# Patient Record
Sex: FEMALE | Race: WHITE | Employment: OTHER | URBAN - NONMETROPOLITAN AREA
[De-identification: names, ages, dates, MRNs, and addresses within clinical notes are randomized per-mention and may not be internally consistent; named-entity substitution may affect disease eponyms.]

---

## 2017-01-05 ENCOUNTER — OFFICE VISIT (OUTPATIENT)
Dept: OTOLARYNGOLOGY | Facility: OTHER | Age: 67
End: 2017-01-05
Attending: PHYSICIAN ASSISTANT
Payer: MEDICARE

## 2017-01-05 VITALS
SYSTOLIC BLOOD PRESSURE: 116 MMHG | BODY MASS INDEX: 25.9 KG/M2 | OXYGEN SATURATION: 95 % | WEIGHT: 165 LBS | HEIGHT: 67 IN | TEMPERATURE: 99.3 F | HEART RATE: 82 BPM | DIASTOLIC BLOOD PRESSURE: 56 MMHG

## 2017-01-05 DIAGNOSIS — H90.5 SNHL (SENSORINEURAL HEARING LOSS): ICD-10-CM

## 2017-01-05 DIAGNOSIS — R22.0 SWELLING, MASS, OR LUMP ON FACE: ICD-10-CM

## 2017-01-05 DIAGNOSIS — R09.81 NASAL CONGESTION: Primary | ICD-10-CM

## 2017-01-05 DIAGNOSIS — J31.0 CHRONIC RHINITIS: ICD-10-CM

## 2017-01-05 DIAGNOSIS — J01.01 ACUTE RECURRENT MAXILLARY SINUSITIS: ICD-10-CM

## 2017-01-05 DIAGNOSIS — J30.2 SEASONAL ALLERGIC RHINITIS, UNSPECIFIED ALLERGIC RHINITIS TRIGGER: ICD-10-CM

## 2017-01-05 PROCEDURE — 86003 ALLG SPEC IGE CRUDE XTRC EA: CPT | Performed by: PHYSICIAN ASSISTANT

## 2017-01-05 PROCEDURE — 87102 FUNGUS ISOLATION CULTURE: CPT | Performed by: PHYSICIAN ASSISTANT

## 2017-01-05 PROCEDURE — 87077 CULTURE AEROBIC IDENTIFY: CPT | Performed by: PHYSICIAN ASSISTANT

## 2017-01-05 PROCEDURE — 99203 OFFICE O/P NEW LOW 30 MIN: CPT

## 2017-01-05 PROCEDURE — 99214 OFFICE O/P EST MOD 30 MIN: CPT | Performed by: PHYSICIAN ASSISTANT

## 2017-01-05 PROCEDURE — 87070 CULTURE OTHR SPECIMN AEROBIC: CPT | Performed by: PHYSICIAN ASSISTANT

## 2017-01-05 PROCEDURE — 87205 SMEAR GRAM STAIN: CPT | Performed by: PHYSICIAN ASSISTANT

## 2017-01-05 PROCEDURE — 99214 OFFICE O/P EST MOD 30 MIN: CPT

## 2017-01-05 PROCEDURE — 87186 SC STD MICRODIL/AGAR DIL: CPT | Mod: 91 | Performed by: PHYSICIAN ASSISTANT

## 2017-01-05 PROCEDURE — 36415 COLL VENOUS BLD VENIPUNCTURE: CPT | Performed by: PHYSICIAN ASSISTANT

## 2017-01-05 RX ORDER — MONTELUKAST SODIUM 10 MG/1
10 TABLET ORAL AT BEDTIME
Qty: 30 TABLET | Refills: 11 | Status: SHIPPED | OUTPATIENT
Start: 2017-01-05

## 2017-01-05 ASSESSMENT — PAIN SCALES - GENERAL: PAINLEVEL: NO PAIN (0)

## 2017-01-05 NOTE — PATIENT INSTRUCTIONS
Allergy blood panel to be completed. Results pending.   Consider skin testing.     Continue with Zyrtec  Continue with neti pot (sinus rinse)  Use rinse 1-2 times daily. Rinse, then wait 15 minutes and blow nose then use spray  Use Flonase 2 sprays to each nostril daily    Start singulair one tablet at night    Diagnostic imaging will contact you for test.     If there are concerns or questions, Call [phone] and ask for nurse

## 2017-01-05 NOTE — NURSING NOTE
"Chief Complaint   Patient presents with     Consult     allergies- referred by Rocky       Initial /56 mmHg  Pulse 82  Temp(Src) 99.3  F (37.4  C) (Tympanic)  Ht 5' 7\" (1.702 m)  Wt 165 lb (74.844 kg)  BMI 25.84 kg/m2  SpO2 95% Estimated body mass index is 25.84 kg/(m^2) as calculated from the following:    Height as of this encounter: 5' 7\" (1.702 m).    Weight as of this encounter: 165 lb (74.844 kg).  BP completed using cuff size: lauro Womack LPN      "

## 2017-01-05 NOTE — MR AVS SNAPSHOT
After Visit Summary   1/5/2017    Tamra Dc    MRN: [id number]           Patient Information     Date Of Birth          1950        Visit Information        Provider Department      1/5/2017 2:00 PM Silva Tinajero PA-C [facility]        Today's Diagnoses     Nasal congestion    -  1     Seasonal allergic rhinitis, unspecified allergic rhinitis trigger         Chronic rhinitis           Care Instructions    Allergy blood panel to be completed. Results pending.   Consider skin testing.     Continue with Zyrtec  Continue with neti pot (sinus rinse)  Use rinse 1-2 times daily. Rinse, then wait 15 minutes and blow nose then use spray  Use Flonase 2 sprays to each nostril daily    Start singulair one tablet at night    Diagnostic imaging will contact you for test.     If there are concerns or questions, Call [phone] and ask for nurse        Follow-ups after your visit        Follow-up notes from your care team     Return in about 4 weeks (around 2/2/2017).      Who to contact     If you have questions or need follow up information about today's clinic visit or your schedule please [facility] at [phone].  Normal or non-critical lab and imaging results will be communicated to you by MobileRQhart, letter or phone within 4 business days after the clinic has received the results. If you do not hear from us within 7 days, please contact the clinic through evlyt or phone. If you have a critical or abnormal lab result, we will notify you by phone as soon as possible.  Submit refill requests through Searchles or call your pharmacy and they will forward the refill request to us. Please allow 3 business days for your refill to be completed.          Additional Information About Your Visit        MobileRQharSimpliVity Information     Searchles lets you send messages to your doctor, view your test results, renew your prescriptions, schedule appointments and more. To sign up, go to  [email or web address] . Click on \"Log in\" on the left side of the screen, which will take you to the Welcome page. Then click on \"Sign up Now\" on the right side of the page.     You will be asked to enter the access code listed below, as well as some personal information. Please follow the directions to create your username and password.     Your access code is: [email or web address]  Expires: 2017  3:11 PM     Your access code will  in 90 days. If you need help or a new code, please call your [place] clinic or [phone].        Care EveryWhere ID     This is your Care EveryWhere ID. This could be used by other organizations to access your [place] medical records  [id number]        Your Vitals Were     Pulse Temperature Height BMI (Body Mass Index) Pulse Oximetry       82 99.3  F (37.4  C) (Tympanic) 5' 7\" (1.702 m) 25.84 kg/m2 95%        Blood Pressure from Last 3 Encounters:   17 116/56   16 129/54   05/09/15 149/72    Weight from Last 3 Encounters:   17 165 lb (74.844 kg)              We Performed the Following     Allergen alternaria alternata IgE     Allergen leroy white IgE     Allergen aspergillus fumigatus IgE     Allergen cat epithellium IgE     Allergen Cedar IgE     Allergen cladosporium herbarum IgE     Allergen Cockroach American     Allergen Cockroach Macedonian     Allergen cottonwood/aspen IgE     Allergen D farinae IgE     Allergen D pteronyssinus IgE     Allergen dog epithelium IgE     Allergen Epicoccum purpurascens IgE     Allergen giant ragweed IgE     Allergen Goose Feathers IgE     Allergen helminthosporium halodes IgE     Allergen horse dander IgE     Allergen maple box elder IgE     Allergen Mucor racemosus IgE     Allergen Mugwort IgE     Allergen oak white IgE     Allergen penicillium notatum IgE     Allergen ragweed short IgE     Allergen Red [place] IgE     Allergen Rhizopus nigricans IgE     Allergen Sagebrush Wormwood IgE     Allergen Sheep Scurry IgE     " Allergen silver  birch IgE     Allergen thistle Russian IgE     Allergen dhruv IgE     Allergen white pine IgE     Allergen Yellow Dock IgE     Allergen, Kochia/Firebush     Allergen, Pigweed          Today's Medication Changes          These changes are accurate as of: 1/5/17  3:11 PM.  If you have any questions, ask your nurse or doctor.               Start taking these medicines.        Dose/Directions    montelukast 10 MG tablet   Commonly known as:  SINGULAIR   Used for:  Seasonal allergic rhinitis, unspecified allergic rhinitis trigger, Chronic rhinitis, Nasal congestion   Started by:  Silva Tinajero PA-C        Dose:  10 mg   Take 1 tablet (10 mg) by mouth At Bedtime   Quantity:  30 tablet   Refills:  11            Where to get your medicines      These medications were sent to [facility] - [address] AT [address] [address]     Phone:  [phone]    - montelukast 10 MG tablet             Primary Care Provider Office Phone # Fax #    Fito Hidalgo [phone]       [address] [address]        Thank you!     Thank you for choosing [facility]  for your care. Our goal is always to provide you with excellent care. Hearing back from our patients is one way we can continue to improve our services. Please take a few minutes to complete the written survey that you may receive in the mail after your visit with us. Thank you!             Your Updated Medication List - Protect others around you: Learn how to safely use, store and throw away your medicines at www.disposemymeds.org.          This list is accurate as of: 1/5/17  3:11 PM.  Always use your most recent med list.                   Brand Name Dispense Instructions for use    ASPIRIN EC PO      Take 81 mg by mouth daily       calcium-vitamin D 600-400 MG-UNIT per tablet    CALTRATE     Take 1 tablet by  mouth 2 times daily       CARDIZEM  MG 24 hr capsule   Generic drug:  diltiazem      Take 240 mg by mouth daily       cetirizine 10 MG tablet    zyrTEC     Take 10 mg by mouth daily as needed for allergies       fluticasone 220 MCG/ACT Inhaler    FLOVENT HFA     Inhale 2 puffs into the lungs 2 times daily       fluticasone 50 MCG/ACT spray    FLONASE     Spray 1 spray into both nostrils daily       HYDROcodone-acetaminophen 5-325 MG per tablet    NORCO     Take 1 tablet by mouth every 4 hours as needed for moderate to severe pain       IBUPROFEN PO      Take 400 mg by mouth every 6 hours as needed for moderate pain       METOPROLOL SUCCINATE ER PO      Take 50 mg by mouth daily       montelukast 10 MG tablet    SINGULAIR    30 tablet    Take 1 tablet (10 mg) by mouth At Bedtime       MULTI VITAMIN DAILY PO      Take 1 tablet by mouth daily       SIMVASTATIN PO      Take 10 mg by mouth At Bedtime       SYNTHROID PO      Take 175 mcg by mouth daily       triamcinolone 0.1 % cream    KENALOG     Apply topically 2 times daily       VITAMIN D (CHOLECALCIFEROL) PO      Take 1,000 Units by mouth daily

## 2017-01-05 NOTE — PROGRESS NOTES
"  Chief Complaint   Patient presents with     Consult     allergies- referred by Rocky       Tamra presents with concerns regarding allergies. She had developed an URI following PND. She does have a hx of SCIT twice. She did have hx of SCIT while in high school and further had injections in her later years for 10 years.   She had positives ragweed, weeds, trees, cat, dog and dust.   She did do well following SCIT, but reports symptoms have been worsening. Tamra reports las testing was about 7-8 years ago.   Tamra has been using Flonase. She has tried Zyrtec, Allegra, Claritin, without relief. She has been using Flovent with good results. She has Neti pot and uses it at times.   She has continued post nasal drip and clearing her throat.     No recurrent sinusitis. No hx of sinus infection. No facial pain or pressure. She has no otalgia, otorrhea.     She has SNHL and [facility]   Denies vertigo.           No past medical history on file.   No Known Allergies  Current Outpatient Prescriptions   Medication     ASPIRIN EC PO     calcium-vitamin D (CALTRATE) 600-400 MG-UNIT per tablet     cetirizine (ZYRTEC) 10 MG tablet     VITAMIN D, CHOLECALCIFEROL, PO     diltiazem (CARDIZEM CD) 240 MG 24 hr capsule     fluticasone (FLONASE) 50 MCG/ACT spray     fluticasone (FLOVENT HFA) 220 MCG/ACT Inhaler     HYDROcodone-acetaminophen (NORCO) 5-325 MG per tablet     IBUPROFEN PO     METOPROLOL SUCCINATE ER PO     Multiple Vitamin (MULTI VITAMIN DAILY PO)     SIMVASTATIN PO     triamcinolone (KENALOG) 0.1 % cream     Levothyroxine Sodium (SYNTHROID PO)     No current facility-administered medications for this visit.      ROS: 10 point ROS neg other than the symptoms noted above in the HPI.    /56 mmHg  Pulse 82  Temp(Src) 99.3  F (37.4  C) (Tympanic)  Ht 5' 7\" (1.702 m)  Wt 165 lb (74.844 kg)  BMI 25.84 kg/m2  SpO2 95%      This is a 66 year old patient    General Appearance:  healthy, " alert, active, no distress and Lack of facial expression throughout exam.   Head: Normocephalic. No masses, lesions, tenderness or abnormalities  Eyes: conjuctiva clear, PERRL, EOM intact  Ears: External ears normal. Canals clear. TM's normal.  Nose: Nares normal  Mouth: normal  Neck: Supple, no cervical adenopathy, no thyromegaly, Full range of motion in all planes  Skin: Skin color, texture, turgor normal. No rashes or lesions.  Right mandibular angle palpable 1.5 cm lump.         The lips appear normal and without lesion.  The dentition is  in good condition  The patient has a normal appearing and functioning hard and soft palate without bifid uvula or submucosal cleft.  The tongue is normal in appearance without erosive lesion or fungating mass.  The oral mucosa is soft and normal in appearance.  The patient also has a soft floor of mouth and base of tongue.        ASSESSMENT:    ICD-10-CM    1. Nasal congestion R09.81 Allergen cottonwood/aspen IgE     Allergen dhruv IgE     Allergen ragweed short IgE     Allergen giant ragweed IgE     Allergen Mugwort IgE     Allergen thistle Russian IgE     Allergen, Pigweed     Allergen, Kochia/Firebush     Allergen Sheep Sorrel IgE     Allergen Yellow Dock IgE     Allergen Sagebrush Wormwood IgE     Allergen maple box elder IgE     Allergen silver  birch IgE     Allergen oak white IgE     Allergen leroy white IgE     Allergen white pine IgE     Allergen Red [place] IgE     Allergen Cedar IgE     Allergen dog epithelium IgE     Allergen cat epithellium IgE     Allergen horse dander IgE     Allergen Goose Feathers IgE     Allergen penicillium notatum IgE     Allergen cladosporium herbarum IgE     Allergen aspergillus fumigatus IgE     Allergen Mucor racemosus IgE     Allergen alternaria alternata IgE     Allergen helminthosporium halodes IgE     Allergen Rhizopus nigricans IgE     Allergen Epicoccum purpurascens IgE     Allergen D pteronyssinus IgE     Allergen D farinae IgE      Allergen Cockroach American     Allergen Cockroach North Korean     montelukast (SINGULAIR) 10 MG tablet     Sinus Culture Aerobic Bacterial     Fungus Culture, non-blood     Gram stain   2. Seasonal allergic rhinitis, unspecified allergic rhinitis trigger J30.2 Allergen cottonwood/aspen IgE     Allergen dhruv IgE     Allergen ragweed short IgE     Allergen giant ragweed IgE     Allergen Mugwort IgE     Allergen thistle Russian IgE     Allergen, Pigweed     Allergen, Kochia/Firebush     Allergen Sheep Sorrel IgE     Allergen Yellow Dock IgE     Allergen Sagebrush Wormwood IgE     Allergen maple box elder IgE     Allergen silver  birch IgE     Allergen oak white IgE     Allergen leroy white IgE     Allergen white pine IgE     Allergen Red [place] IgE     Allergen Cedar IgE     Allergen dog epithelium IgE     Allergen cat epithellium IgE     Allergen horse dander IgE     Allergen Goose Feathers IgE     Allergen penicillium notatum IgE     Allergen cladosporium herbarum IgE     Allergen aspergillus fumigatus IgE     Allergen Mucor racemosus IgE     Allergen alternaria alternata IgE     Allergen helminthosporium halodes IgE     Allergen Rhizopus nigricans IgE     Allergen Epicoccum purpurascens IgE     Allergen D pteronyssinus IgE     Allergen D farinae IgE     Allergen Cockroach American     Allergen Cockroach North Korean     montelukast (SINGULAIR) 10 MG tablet     Sinus Culture Aerobic Bacterial     Fungus Culture, non-blood     Gram stain   3. Chronic rhinitis J31.0 Allergen cottonwood/aspen IgE     Allergen dhruv IgE     Allergen ragweed short IgE     Allergen giant ragweed IgE     Allergen Mugwort IgE     Allergen thistle Russian IgE     Allergen, Pigweed     Allergen, Kochia/Firebush     Allergen Sheep Sorrel IgE     Allergen Yellow Dock IgE     Allergen Sagebrush Wormwood IgE     Allergen maple box elder IgE     Allergen silver  birch IgE     Allergen oak white IgE     Allergen leroy white IgE     Allergen white pine  IgE     Allergen Red Mabel IgE     Allergen Cedar IgE     Allergen dog epithelium IgE     Allergen cat epithellium IgE     Allergen horse dander IgE     Allergen Goose Feathers IgE     Allergen penicillium notatum IgE     Allergen cladosporium herbarum IgE     Allergen aspergillus fumigatus IgE     Allergen Mucor racemosus IgE     Allergen alternaria alternata IgE     Allergen helminthosporium halodes IgE     Allergen Rhizopus nigricans IgE     Allergen Epicoccum purpurascens IgE     Allergen D pteronyssinus IgE     Allergen D farinae IgE     Allergen Cockroach American     Allergen Cockroach French     montelukast (SINGULAIR) 10 MG tablet     Sinus Culture Aerobic Bacterial     Fungus Culture, non-blood     Gram stain   4. SNHL (sensorineural hearing loss) H90.5    5. Swelling, mass, or lump on face R22.0          Allergy blood panel to be completed. Results pending.   Consider skin testing pending the results. You will need to be off of your beta blocker prior to skin testing.     Continue with Zyrtec  Continue with neti pot (sinus rinse)  Use rinse 1-2 times daily. Rinse, then wait 15 minutes and blow nose then use spray  Use Flonase 2 sprays to each nostril daily    Start singulair one tablet at night    Diagnostic imaging will contact you for test. CT of soft tissue will be completed. Patient states possible cyst, however due to location I am going to rule out parotid involvement. She may require in office FNA     Continue with neurology appointment.     Indications for allergy testing include:    1) Confirm suspicion of allergic rhinitis due to inhalant allergies  2) Identify the offending allergen to determine specific mode of treatment  3) In the case of chronic rhinosinusitis: when symptoms are not controlled by avoidance and pharmacotherapy  4) In the Asthma patient when exacerbations may be due to perennial allergen exposure  5) Suspect food allergy  6) Otitis Media, chronic rhinitis, atopic  dermatitis, Meniere disease, headache, pharyngitis or eye symptoms      Modified quantitative testing (MQT) will be performed.  Signed consent was obtained, and the risks of immunotherapy were discussed, including the potential for anaphylaxis.     If immunotherapy (IT) is recommended, there is continued risk of anaphylaxis.   Anaphylaxis can cause death. The patient will need to be monitored for 30 minutes post injection.  They must present their epinephrine pen prior to injection.  Subcutaneous as well as sublingual immunotherapy (SLIT) were discussed as potential treatment options.  The patient was told SLIT is not approved by the FDA and is cash pay.  The general time frame of immunotherapy was discussed (generally 3-5 years, sometimes longer), and the basic immunology behind IT was discussed.    Silva Tinajero PA-C  [facility], [place]  [phone]

## 2017-01-06 LAB
GRAM STN SPEC: ABNORMAL
MICRO REPORT STATUS: ABNORMAL
SPECIMEN SOURCE: ABNORMAL

## 2017-01-10 LAB
A FUMIGATUS IGE QN: NORMAL KU(A)/L
AMER ROACH IGE QN: NORMAL KU(A)/L
BACTERIA SPEC CULT: ABNORMAL
CAT DANDER IGG QN: NORMAL KU(A)/L
COTTONWOOD IGE QN: NORMAL KU(A)/L
D FARINAE IGE QN: 0.55 KU(A)/L
D PTERONYSS IGE QN: 0.22 KU(A)/L
DEPRECATED IGE QN: NORMAL KU(A)/L
E PURPURASCENS IGE QN: NORMAL KU(A)/L
GIANT RAGWEED IGE QN: NORMAL KU(A)/L
GOOSE FEATHER IGE QN: NORMAL KU(A)/L
HORSE DANDER IGE QN: NORMAL KU(A)/L
M RACEMOSUS IGE QN: NORMAL KU(A)/L
MAPLE IGE QN: NORMAL KU(A)/L
MICRO REPORT STATUS: ABNORMAL
MICROORGANISM SPEC CULT: ABNORMAL
RED MULBERRY IGE QN: NORMAL KU(A)/L
SPECIMEN SOURCE: ABNORMAL
TIMOTHY IGE QN: NORMAL KU(A)/L
WHITE OAK IGE QN: NORMAL KU(A)/L
YELLOW DOCK IGE QN: NORMAL KU(A)/L

## 2017-01-11 LAB
A ALTERNATA IGE QN: NORMAL KU(A)/L
C HERBARUM IGE QN: NORMAL KU(A)/L
CEDAR IGE QN: NORMAL KU(A)/L
COMMON RAGWEED IGE QN: NORMAL KU(A)/L
DOG DANDER+EPITH IGE QN: NORMAL KU(A)/L
EAST WHITE PINE IGE QN: NORMAL KU(A)/L
FIREBUSH IGE QN: NORMAL KU(A)/L
MUGWORT IGE QN: NORMAL KU(A)/L
P NOTATUM IGE QN: NORMAL KU(A)/L
R NIGRICANS IGE QN: NORMAL KU(A)/L
ROACH IGE QN: NORMAL KU(A)/L
S ROSTRATA IGE QN: NORMAL KU(A)/L
SALTWORT IGE QN: NORMAL KU(A)/L
SHEEP SORREL IGE QN: NORMAL KU(A)/L
SILVER BIRCH IGE QN: NORMAL KU(A)/L
WHITE ASH IGE QN: NORMAL KU(A)/L
WORMWOOD IGE QN: NORMAL KU(A)/L

## 2017-01-12 RX ORDER — CIPROFLOXACIN 500 MG/1
500 TABLET, FILM COATED ORAL 2 TIMES DAILY
Qty: 20 TABLET | Refills: 0 | Status: SHIPPED | OUTPATIENT
Start: 2017-01-12 | End: 2017-01-13

## 2017-01-13 ENCOUNTER — TELEPHONE (OUTPATIENT)
Dept: ALLERGY | Facility: OTHER | Age: 67
End: 2017-01-13

## 2017-01-13 DIAGNOSIS — J01.01 ACUTE RECURRENT MAXILLARY SINUSITIS: Primary | ICD-10-CM

## 2017-01-13 RX ORDER — SULFAMETHOXAZOLE AND TRIMETHOPRIM 400; 80 MG/1; MG/1
1 TABLET ORAL 2 TIMES DAILY
Qty: 20 TABLET | Refills: 0 | Status: SHIPPED | OUTPATIENT
Start: 2017-01-13 | End: 2017-01-23

## 2017-01-13 NOTE — TELEPHONE ENCOUNTER
"This call was transferred to my .    Tamra called and LM stating her medication for \"bacteria\" was not helping.    I will transfer this call to JOANN Hopper, Silva Adan nurse.  Lesley Hoffmann    "

## 2017-01-13 NOTE — TELEPHONE ENCOUNTER
I spoke with pt and she is concerned about the possible side effects of the ciprofloxacin.  She said that it can tear your intestines and can cause death.  She is wondering if there is a different antibiotic that she can try.  I did let her know that she would have to take an antibiotic that is suscepitible to the bacteria that grew out in her culture.  She uses [facility].

## 2017-01-17 DIAGNOSIS — R22.1 MASS OF RIGHT SIDE OF NECK: Primary | ICD-10-CM

## 2017-01-18 ENCOUNTER — HOSPITAL ENCOUNTER (OUTPATIENT)
Dept: CT IMAGING | Facility: HOSPITAL | Age: 67
Discharge: HOME | End: 2017-01-18
Attending: PHYSICIAN ASSISTANT | Admitting: PHYSICIAN ASSISTANT
Payer: MEDICARE

## 2017-01-18 PROCEDURE — 70491 CT SOFT TISSUE NECK W/DYE: CPT | Mod: TC

## 2017-01-18 RX ORDER — IOPAMIDOL 612 MG/ML
100 INJECTION, SOLUTION INTRAVASCULAR ONCE
Status: COMPLETED | OUTPATIENT
Start: 2017-01-18 | End: 2017-01-18

## 2017-01-18 RX ADMIN — IOPAMIDOL 100 ML: 612 INJECTION, SOLUTION INTRAVASCULAR at 12:17

## 2017-02-03 ENCOUNTER — OFFICE VISIT (OUTPATIENT)
Dept: OTOLARYNGOLOGY | Facility: OTHER | Age: 67
End: 2017-02-03
Attending: OTOLARYNGOLOGY
Payer: MEDICARE

## 2017-02-03 VITALS
WEIGHT: 165 LBS | OXYGEN SATURATION: 95 % | SYSTOLIC BLOOD PRESSURE: 124 MMHG | DIASTOLIC BLOOD PRESSURE: 68 MMHG | RESPIRATION RATE: 15 BRPM | HEART RATE: 69 BPM | TEMPERATURE: 98.7 F | HEIGHT: 67 IN | BODY MASS INDEX: 25.9 KG/M2

## 2017-02-03 DIAGNOSIS — D23.30 CYST, DERMOID, FACE: Primary | ICD-10-CM

## 2017-02-03 DIAGNOSIS — R29.818 PARKINSONIAN FEATURES: ICD-10-CM

## 2017-02-03 DIAGNOSIS — R22.1 MASS OF NECK: ICD-10-CM

## 2017-02-03 LAB
FUNGUS SPEC CULT: NORMAL
MICRO REPORT STATUS: NORMAL
SPECIMEN SOURCE: NORMAL

## 2017-02-03 PROCEDURE — 00000155 ZZHCL STATISTIC H-CELL BLOCK W/STAIN: Performed by: OTOLARYNGOLOGY

## 2017-02-03 PROCEDURE — 99212 OFFICE O/P EST SF 10 MIN: CPT | Performed by: COUNSELOR

## 2017-02-03 PROCEDURE — 10021 FNA BX W/O IMG GDN 1ST LES: CPT | Performed by: OTOLARYNGOLOGY

## 2017-02-03 PROCEDURE — 99213 OFFICE O/P EST LOW 20 MIN: CPT | Mod: 25 | Performed by: OTOLARYNGOLOGY

## 2017-02-03 PROCEDURE — 88305 TISSUE EXAM BY PATHOLOGIST: CPT | Mod: TC | Performed by: OTOLARYNGOLOGY

## 2017-02-03 PROCEDURE — 88173 CYTOPATH EVAL FNA REPORT: CPT | Mod: TC | Performed by: OTOLARYNGOLOGY

## 2017-02-03 ASSESSMENT — PAIN SCALES - GENERAL: PAINLEVEL: NO PAIN (0)

## 2017-02-03 NOTE — PROGRESS NOTES
"Otolaryngology Progress Note      History of Present Illness   Patient presents with:  RECHECK: Neck Mass, CT done      Tamra Dc is a 66 year old female   with a neck mass here for eval, seen by Silva Tinajero ENT PA for rhinitis which is improving    10 year plus history of a right preauricualar mass  Denies drainage  No tobacco use  No otalgia or weight loss  Not biopsied  No history of parotiitis or distasteful oral drainge  Seems maybe to have enlarged slightly over past few months  No facial weaknes or paralysis    Physical Exam  /68 mmHg  Pulse 69  Temp(Src) 98.7  F (37.1  C) (Tympanic)  Resp 15  Ht 5' 7\" (1.702 m)  Wt 165 lb (74.844 kg)  BMI 25.84 kg/m2  SpO2 95%  General - The patient is well nourished and well developed, and appears to have good nutritional status.  Alert and oriented to person and place, interactive.  Mask facies, flat affect, parkinsonian  Head and Face - Normocephalic and atraumatic, with no gross asymmetry noted of the contour of the facial features.  The facial nerve is intact, with strong symmetric movements.  Neck-no palpable concerning fixed lymphadenopathy or thyroid mass.  Trachea is midline.  There is a right infra-auricular 2.0x1.2 cm cystic lesion, by palp SCT, facial nerve symmetric, no punctum or drainage, skin slightly bluish hue and lobular mass  Eyes - Extraocular movements intact.   Nose - Nasal mucosa is pink and moist with no abnormal mucus.  The septum was  non-obstructive, turbinates of normal size and position.  No polyps, masses, or purulence noted on examination.  Mouth - Examination of the oral cavity shows pink, healthy, moist mucosa.  No lesions or ulceration noted.  The dentition are in good repair.  The tongue is mobile and midline.  Throat - The walls of the oropharynx were smooth, pink, moist, symmetric, and had no lesions or ulcerations.  The tonsillar pillars and soft palate were symmetric.  The uvula was midline on elevation.        CT neck " reviewed with Tamra dated 1/18/17:    There is a 1.0 cm subcutaneous right preauricular cyst vs lymph node, underlying parotid normal and symmetric c/w left    After consent was obtained and risks discussed including temporary facial paralysis, need for further biopsy or surgery, time out was taken and the area prepped with alcohol, a skin marker was used to demarcate the mass in the right infraauricular skin.  1% lidocaine with 1:200,000 of epinephrine was used to anesthetize the are area.  The skin was prepped in the normal fashion and a time out was taken.  A 25 and 21 guage needle was used to obtain representative cellular material which was collected and prepared on slides and sent to pathology.    Impression/Plan  Tamra Dc is a 66 year old female    ICD-10-CM    1. Cyst, dermoid, face D23.30 Fine needle aspiration     FINE NEEDLE ASPIRATION; W/O IMAGING GUIDANCE   2. Mass of neck R22.1 Fine needle aspiration     FINE NEEDLE ASPIRATION; W/O IMAGING GUIDANCE   3. Parkinsonian features R25.9          The possibility of inadequate cellular material or non-diagnostic results, and the possibility of repeat FNA with image guidance,  was discussed with the patient    This is likely benign based on CT and history/exam but she was concerned enough to want further work up  We will contact her with path    Clean the area with half hydrogen peroxide, half tap water for 3 days  Follow up with ENT as needed if lesion enlarges significantly or drains      Viktoria Johnson D.O.  Otolaryngology/Head and Neck Surgery  Allergy

## 2017-02-03 NOTE — MR AVS SNAPSHOT
"              After Visit Summary   2/3/2017    Tamra Dc    MRN: [id number]           Patient Information     Date Of Birth          1950        Visit Information        Provider Department      2/3/2017 12:30 PM Viktoria Johnson MD [facility]        Care Instructions    Thank you for allowing Dr. Johnson and our ENT team to participate in your care.  If you have a scheduling or an appointment question please contact [facility] Unit Coordinator at their direct line [phone].   ALL nursing questions or concerns can be directed to your ENT nurse at: [phone] - Erin    We will call you with the results of the biopsy  Clean the area with half hydrogen peroxide, half tap water for 3 days  Follow up with ENT as needed        Follow-ups after your visit        Who to contact     If you have questions or need follow up information about today's clinic visit or your schedule please contact [facility] directly at [phone].  Normal or non-critical lab and imaging results will be communicated to you by MyChart, letter or phone within 4 business days after the clinic has received the results. If you do not hear from us within 7 days, please contact the clinic through [employer] or phone. If you have a critical or abnormal lab result, we will notify you by phone as soon as possible.  Submit refill requests through [employer] or call your pharmacy and they will forward the refill request to us. Please allow 3 business days for your refill to be completed.          Additional Information About Your Visit        [employer] Information     [employer] lets you send messages to your doctor, view your test results, renew your prescriptions, schedule appointments and more. To sign up, go to [email or web address] . Click on \"Log in\" on the left side of the screen, which will take you to the Welcome page. Then click on \"Sign up Now\" on the right side of the page.     You will be " "asked to enter the access code listed below, as well as some personal information. Please follow the directions to create your username and password.     Your access code is: [email or web address]  Expires: 2017  3:11 PM     Your access code will  in 90 days. If you need help or a new code, please call your [place] clinic or [phone].        Care EveryWhere ID     This is your Care EveryWhere ID. This could be used by other organizations to access your [place] medical records  [id number]        Your Vitals Were     Pulse Temperature Respirations Height BMI (Body Mass Index) Pulse Oximetry    69 98.7  F (37.1  C) (Tympanic) 15 5' 7\" (1.702 m) 25.84 kg/m2 95%       Blood Pressure from Last 3 Encounters:   17 124/68   17 116/56   16 129/54    Weight from Last 3 Encounters:   17 165 lb (74.844 kg)   17 165 lb (74.844 kg)              Today, you had the following     No orders found for display       Primary Care Provider Office Phone # Fax #    Fito Hidalgo [phone]       [place] [address] [address] [place], [place] [id number]        Thank you!     Thank you for choosing [facility]  for your care. Our goal is always to provide you with excellent care. Hearing back from our patients is one way we can continue to improve our services. Please take a few minutes to complete the written survey that you may receive in the mail after your visit with us. Thank you!             Your Updated Medication List - Protect others around you: Learn how to safely use, store and throw away your medicines at www.disposemymeds.org.          This list is accurate as of: 2/3/17 12:50 PM.  Always use your most recent med list.                   Brand Name Dispense Instructions for use    ASPIRIN EC PO      Take 81 mg by mouth daily       calcium-vitamin D 600-400 MG-UNIT per tablet    CALTRATE     Take 1 tablet by mouth 2 times daily       CARDIZEM  MG 24 hr " capsule   Generic drug:  diltiazem      Take 240 mg by mouth daily       cetirizine 10 MG tablet    zyrTEC     Take 10 mg by mouth daily as needed for allergies       fluticasone 220 MCG/ACT Inhaler    FLOVENT HFA     Inhale 2 puffs into the lungs 2 times daily       fluticasone 50 MCG/ACT spray    FLONASE     Spray 1 spray into both nostrils daily       IBUPROFEN PO      Take 400 mg by mouth every 6 hours as needed for moderate pain       METOPROLOL SUCCINATE ER PO      Take 50 mg by mouth daily       montelukast 10 MG tablet    SINGULAIR    30 tablet    Take 1 tablet (10 mg) by mouth At Bedtime       MULTI VITAMIN DAILY PO      Take 1 tablet by mouth daily       SIMVASTATIN PO      Take 10 mg by mouth At Bedtime       SYNTHROID PO      Take 175 mcg by mouth daily       triamcinolone 0.1 % cream    KENALOG     Apply topically 2 times daily

## 2017-02-03 NOTE — PATIENT INSTRUCTIONS
Thank you for allowing Dr. Johnson and our ENT team to participate in your care.  If you have a scheduling or an appointment question please contact [facility] Unit Coordinator at their direct line [phone].   ALL nursing questions or concerns can be directed to your ENT nurse at: [phone] - Erin    We will call you with the results of the biopsy  Clean the area with half hydrogen peroxide, half tap water for 3 days  Follow up with ENT as needed

## 2017-02-03 NOTE — NURSING NOTE
"Chief Complaint   Patient presents with     RECHECK     Neck Mass, CT done       Initial /68 mmHg  Pulse 69  Temp(Src) 98.7  F (37.1  C) (Tympanic)  Resp 15  Ht 5' 7\" (1.702 m)  Wt 165 lb (74.844 kg)  BMI 25.84 kg/m2  SpO2 95% Estimated body mass index is 25.84 kg/(m^2) as calculated from the following:    Height as of this encounter: 5' 7\" (1.702 m).    Weight as of this encounter: 165 lb (74.844 kg).  BP completed using cuff size: lauro Aguilera      "

## 2017-02-07 LAB — COPATH REPORT: NORMAL

## 2017-06-08 ENCOUNTER — TRANSFERRED RECORDS (OUTPATIENT)
Dept: HEALTH INFORMATION MANAGEMENT | Facility: CLINIC | Age: 67
End: 2017-06-08

## 2018-04-10 ENCOUNTER — OFFICE VISIT (OUTPATIENT)
Dept: SURGERY | Facility: OTHER | Age: 68
End: 2018-04-10
Attending: SURGERY
Payer: MEDICARE

## 2018-04-10 VITALS
BODY MASS INDEX: 27.47 KG/M2 | TEMPERATURE: 99.2 F | OXYGEN SATURATION: 97 % | SYSTOLIC BLOOD PRESSURE: 126 MMHG | DIASTOLIC BLOOD PRESSURE: 68 MMHG | WEIGHT: 175 LBS | HEIGHT: 67 IN | HEART RATE: 66 BPM

## 2018-04-10 DIAGNOSIS — I10 ESSENTIAL HYPERTENSION: ICD-10-CM

## 2018-04-10 DIAGNOSIS — Z12.11 SPECIAL SCREENING FOR MALIGNANT NEOPLASMS, COLON: Primary | ICD-10-CM

## 2018-04-10 DIAGNOSIS — Z01.818 ENCOUNTER FOR PREOPERATIVE EXAMINATION FOR GENERAL SURGICAL PROCEDURE: ICD-10-CM

## 2018-04-10 DIAGNOSIS — R19.5 OCCULT BLOOD POSITIVE STOOL: ICD-10-CM

## 2018-04-10 PROBLEM — L72.0 EPIDERMAL INCLUSION CYST: Status: ACTIVE | Noted: 2018-01-23

## 2018-04-10 PROBLEM — G20.A1 PARKINSON DISEASE (H): Status: ACTIVE | Noted: 2018-01-22

## 2018-04-10 PROCEDURE — G0463 HOSPITAL OUTPT CLINIC VISIT: HCPCS

## 2018-04-10 PROCEDURE — 99203 OFFICE O/P NEW LOW 30 MIN: CPT | Performed by: SURGERY

## 2018-04-10 PROCEDURE — 93005 ELECTROCARDIOGRAM TRACING: CPT | Performed by: INTERNAL MEDICINE

## 2018-04-10 PROCEDURE — 93010 ELECTROCARDIOGRAM REPORT: CPT | Performed by: INTERNAL MEDICINE

## 2018-04-10 RX ORDER — SODIUM, POTASSIUM,MAG SULFATES 17.5-3.13G
2 SOLUTION, RECONSTITUTED, ORAL ORAL SEE ADMIN INSTRUCTIONS
Qty: 2 BOTTLE | Refills: 0 | Status: ON HOLD | OUTPATIENT
Start: 2018-04-10 | End: 2018-04-18

## 2018-04-10 RX ORDER — HYDROCODONE BITARTRATE AND ACETAMINOPHEN 5; 325 MG/1; MG/1
TABLET ORAL
COMMUNITY
Start: 2015-10-15 | End: 2018-04-10

## 2018-04-10 RX ORDER — CARBIDOPA AND LEVODOPA 25; 100 MG/1; MG/1
1 TABLET ORAL 4 TIMES DAILY
COMMUNITY
Start: 2017-12-18

## 2018-04-10 ASSESSMENT — PAIN SCALES - GENERAL: PAINLEVEL: NO PAIN (0)

## 2018-04-10 NOTE — NURSING NOTE
"Chief Complaint   Patient presents with     Consult     colonoscopy consult.  Dr Hidalgo is referring.  Positive IFOB.  No family history history of colon cancer.  Last colonoscopy was 12 years.         Initial /68 (BP Location: Right arm, Patient Position: Chair, Cuff Size: Adult Regular)  Pulse 66  Temp 99.2  F (37.3  C) (Tympanic)  Ht 5' 7\" (1.702 m)  Wt 175 lb (79.4 kg)  SpO2 97%  BMI 27.41 kg/m2 Estimated body mass index is 27.41 kg/(m^2) as calculated from the following:    Height as of this encounter: 5' 7\" (1.702 m).    Weight as of this encounter: 175 lb (79.4 kg).  Medication Reconciliation: complete   ARIANA ALICEA      "

## 2018-04-10 NOTE — PATIENT INSTRUCTIONS
Thank you for allowing Dr. Nickerson and our surgical team to participate in your care.  If you have a scheduling or an appointment question please contact [facility] Unit Coordinator at her direct line [phone].   ALL nursing questions or concerns can be directed to Wendie at: [phone]     You are scheduled for a: colonoscopy  Your procedure date is: 4/18/18    You need a friend or family member available to drive you home AND stay with you for 24 hours after you leave the hospital. You will not be allowed to drive yourself. IF you need to take a taxi or the bus you MUST have a responsible person to ride with you. YOUR PROCEDURE WILL BE CANCELLED IF YOU DO NOT HAVE A RESPONSIBLE ADULT TO DRIVE YOU HOME.       You CANNOT have anything to eat or drink after midnight the night before your surgery, ncluding water and coffee. Your stomach needs to be completely empty. Do NOT chew gum, suck on hard candy, or smoke. You can brush your teeth the morning of surgery.       You need to call our Surgery Education Nurses 1-2 weeks prior to your surgery date at  [phone] or toll free [phone]. Please have you medication and allergy lists ready.      Stop your aspirin or other NSAIDs(Ibuprofen, Motrin, Aleve, Celebrex, Naproxen, etc...) 7 days before your surgery.      Hospital admitting will call you the day before your surgery with your arrival time. If you are scheduled on a Monday admitting will call you the Friday before.      Please call your primary care physician if you should become ill within 24 hours of scheduled surgery. (ex.vomiting, diarrhea, fever)  Surgery Education will contact you the day before your procedure between the hours of noon and 5 pm with the time you need to register in admitting at the hospital. Call Wendie with any questions [phone]  On day of surgery take your metoprolol with just a sip of water, hold all medications unless otherwise instructed by surgery  education.  Take tylenol only up until surgery for any minor aches and pains.

## 2018-04-10 NOTE — PROGRESS NOTES
Surgery Consult Clinic Note      RE: Tamra Dc  : 1950    Chief Complaint:  Positive colon screening test    History of Present Illness:  Mrs. Dc is a very pleasant 68 year old female who I am seeing at the request of Dr. Fito Hidalgo MD for evaluation of screening colon malignant neoplasm and consideration for colonoscopy.  She denies family history of colon or rectal cancer, blood in stool, changes in bowel habits, weight loss, abdominal pain.  She did have a positive iFOB.  Her last colonoscopy was in  and positive for diverticulosis.  Abdominal surgeries include a hyseterectomy.  She specifically denies fever, chills, nausea, vomiting, chest pain, shortness of breath or palpitations.      Medical history:  HTN  Parkinson    Surgical history:  Hysterectomy     Family history:  No family history on file.    Medications:  Prior to Admission medications    Medication Sig Start Date End Date Taking? Authorizing Provider   carbidopa-levodopa (SINEMET)  MG per tablet Take 1 tablet by mouth daily 17  Yes Reported, Patient   montelukast (SINGULAIR) 10 MG tablet Take 1 tablet (10 mg) by mouth At Bedtime 17   Silva Tinajero PA-C   ASPIRIN EC PO Take 81 mg by mouth daily    Reported, Patient   calcium-vitamin D (CALTRATE) 600-400 MG-UNIT per tablet Take 1 tablet by mouth 2 times daily    Reported, Patient   cetirizine (ZYRTEC) 10 MG tablet Take 10 mg by mouth daily as needed for allergies    Reported, Patient   diltiazem (CARDIZEM CD) 240 MG 24 hr capsule Take 240 mg by mouth daily    Reported, Patient   fluticasone (FLONASE) 50 MCG/ACT spray Spray 1 spray into both nostrils daily    Reported, Patient   fluticasone (FLOVENT HFA) 220 MCG/ACT Inhaler Inhale 2 puffs into the lungs 2 times daily    Reported, Patient   IBUPROFEN PO Take 400 mg by mouth every 6 hours as needed for moderate pain    Reported, Patient   METOPROLOL SUCCINATE ER PO Take 50 mg by mouth daily    Reported, Patient  "  Multiple Vitamin (MULTI VITAMIN DAILY PO) Take 1 tablet by mouth daily    Reported, Patient   SIMVASTATIN PO Take 10 mg by mouth At Bedtime    Reported, Patient   triamcinolone (KENALOG) 0.1 % cream Apply topically 2 times daily    Reported, Patient   Levothyroxine Sodium (SYNTHROID PO) Take 175 mcg by mouth daily     Reported, Patient       Allergies:  The patientis allergic to food and no clinical screening - see comments.  .  Social history:  Social History   Substance Use Topics     Smoking status: Never Smoker     Smokeless tobacco: Not on file     Alcohol use Not on file     Marital status: .      Review of Systems:    Constitutional: Negative for fever, chills and weight loss.   HENT: Negative for ear pain, nosebleeds, congestion, sore throat, tinnitus and ear discharge.    Eyes: Negative for blurred vision, double vision, photophobia and pain.   Respiratory: Negative for cough, hemoptysis, shortness of breath, wheezing and stridor.    Cardiovascular: Negative for chest pain, palpitations and orthopnea.   Gastrointestinal: Negative for heartburn, nausea, vomiting, abdominal pain and blood in stool.   Genitourinary: Negative for urgency, frequency and hematuria.   Musculoskeletal: Negative for myalgias, back pain and joint pain.   Neurological: Negative for tingling, speech change and headaches.   Endo/Heme/Allergies: Does not bruise/bleed easily.   Psychiatric/Behavioral: Negative for depression, suicidal ideas and hallucinations. The patient is not nervous/anxious.    Physical Examination:  /68 (BP Location: Right arm, Patient Position: Chair, Cuff Size: Adult Regular)  Pulse 66  Temp 99.2  F (37.3  C) (Tympanic)  Ht 1.702 m (5' 7\")  Wt 79.4 kg (175 lb)  SpO2 97%  BMI 27.41 kg/m2  General: AAOx4, NAD, WN/WD, ambulating without assistance  HEENT:NCAT, EOMI, PERRL Sclerae anicteric; Trachea mideline, no JVD  Chest:   Clear to auscultation bilaterally.  Cardiac: S1S2 , regular rate and " rhythm without additional sounds  Abdomen: Soft, ND/NT no rebound, no guarding  Extremities: Cursory exam unremarkable.  Skin: Warm, dry, < 2 sec cap refill  Neuro: CN 2-12 grossly intact, no focal deficit, GCS 15  Psych: happy, calm, asks appropriate questions    # Pain Assessment:  Current Pain Score 3/28/2016   Pain score (0-10) 1   Tamra aguilar pain level was assessed and she currently denies pain.        Assessment/Plan:  #1 + iFOB  #2 HTN    Thank you for the consult.  Mrs. Dc and CINDY had a long and sony discussion about colonoscopies.  The indications, risks, benefits, althernatives and technical aspects of whole colon colonoscopy were outlined with risks including, but not limited to, perforation, bleeding and inability to visualize entire colon.  Management of each was reviewed including the risk for life saving surgery and possible admittance to the ICU.  The need of mechanical preparation of the colon was reviewed along with the use of monitored anesthetic care which is needed to ensure proper visualization and safety concerns should biopsy be needed.  The patient's questions were asked and answered.  Scheduled first available date.      Dr Nickerson  [facility]  [address], [address]  [place]    [address]    Referring Provider:  Fito Hidalgo MD  [facility]  [address] [place] [id number]     Primary Care Provider:  Fito Hidalgo

## 2018-04-10 NOTE — MR AVS SNAPSHOT
After Visit Summary   4/10/2018    Tamra Dc    MRN: [id number]           Patient Information     Date Of Birth          1950        Visit Information        Provider Department      4/10/2018 2:00 PM Rashad Nickerson, DO [facility]        Today's Diagnoses     Special screening for malignant neoplasms, colon    -  1    Encounter for preoperative examination for general surgical procedure          Care Instructions          Thank you for allowing Dr. Nickerson and our surgical team to participate in your care.  If you have a scheduling or an appointment question please contact [facility] Unit Coordinator at her direct line [phone].   ALL nursing questions or concerns can be directed to Wendie at: [phone]     You are scheduled for a: colonoscopy  Your procedure date is: 4/18/18    You need a friend or family member available to drive you home AND stay with you for 24 hours after you leave the hospital. You will not be allowed to drive yourself. IF you need to take a taxi or the bus you MUST have a responsible person to ride with you. YOUR PROCEDURE WILL BE CANCELLED IF YOU DO NOT HAVE A RESPONSIBLE ADULT TO DRIVE YOU HOME.       You CANNOT have anything to eat or drink after midnight the night before your surgery, ncluding water and coffee. Your stomach needs to be completely empty. Do NOT chew gum, suck on hard candy, or smoke. You can brush your teeth the morning of surgery.       You need to call our Surgery Education Nurses 1-2 weeks prior to your surgery date at  [phone] or toll free [phone]. Please have you medication and allergy lists ready.      Stop your aspirin or other NSAIDs(Ibuprofen, Motrin, Aleve, Celebrex, Naproxen, etc...) 7 days before your surgery.      Hospital admitting will call you the day before your surgery with your arrival time. If you are scheduled on a Monday admitting will call you the Friday before.      Please call your  "primary care physician if you should become ill within 24 hours of scheduled surgery. (ex.vomiting, diarrhea, fever)  Surgery Education will contact you the day before your procedure between the hours of noon and 5 pm with the time you need to register in admitting at the hospital. Call Wendie with any questions [phone]  On day of surgery take your metoprolol with just a sip of water, hold all medications unless otherwise instructed by surgery education.  Take tylenol only up until surgery for any minor aches and pains.          Follow-ups after your visit        Who to contact     If you have questions or need follow up information about today's clinic visit or your schedule please contact [facility] directly at [phone].  Normal or non-critical lab and imaging results will be communicated to you by [email or web address], letter or phone within 4 business days after the clinic has received the results. If you do not hear from us within 7 days, please contact the clinic through [employer] or phone. If you have a critical or abnormal lab result, we will notify you by phone as soon as possible.  Submit refill requests through [employer] or call your pharmacy and they will forward the refill request to us. Please allow 3 business days for your refill to be completed.          Additional Information About Your Visit        [employer] Information     [employer] lets you send messages to your doctor, view your test results, renew your prescriptions, schedule appointments and more. To sign up, go to [email or web address] . Click on \"Log in\" on the left side of the screen, which will take you to the Welcome page. Then click on \"Sign up Now\" on the right side of the page.     You will be asked to enter the access code listed below, as well as some personal information. Please follow the directions to create your username and password.     Your access code is: [email or web address]  Expires: 7/9/2018  2:37 PM     Your access code will " " in 90 days. If you need help or a new code, please call your [place] clinic or [phone].        Care EveryWhere ID     This is your Care EveryWhere ID. This could be used by other organizations to access your [place] medical records  [id number]        Your Vitals Were     Pulse Temperature Height Pulse Oximetry BMI (Body Mass Index)       66 99.2  F (37.3  C) (Tympanic) 5' 7\" (1.702 m) 97% 27.41 kg/m2        Blood Pressure from Last 3 Encounters:   04/10/18 126/68   17 124/68   17 116/56    Weight from Last 3 Encounters:   04/10/18 175 lb (79.4 kg)   17 165 lb (74.8 kg)   17 165 lb (74.8 kg)              We Performed the Following     EKG 12-lead complete w/read - Clinics          Today's Medication Changes          These changes are accurate as of 4/10/18  2:37 PM.  If you have any questions, ask your nurse or doctor.               These medicines have changed or have updated prescriptions.        Dose/Directions    calcium-vitamin D 600-400 MG-UNIT per tablet   Commonly known as:  CALTRATE   This may have changed:  Another medication with the same name was removed. Continue taking this medication, and follow the directions you see here.        Dose:  1 tablet   Take 1 tablet by mouth 2 times daily   Refills:  0         Stop taking these medicines if you haven't already. Please contact your care team if you have questions.     HYDROcodone-acetaminophen 5-325 MG per tablet   Commonly known as:  NORCO           MULTIPLE VITAMIN/IRON OR                    Primary Care Provider Office Phone # Fax #    Fito Hidalgo [phone]       [facility] [place] [address]  [facility] [address]        Equal Access to Services     [facility]: Gayle Beckwith, abril bailey, domonique vegaaljuli calderón, juliet disla. So [facility] [phone].    ATENCIÓN: Si habla español, tiene a johns disposición servicios gratuitos de " asistencia lingüística. Marvin al [phone].    We comply with applicable federal civil rights laws and [place] laws. We do not discriminate on the basis of race, color, national origin, age, disability, sex, sexual orientation, or gender identity.            Thank you!     Thank you for choosing [facility]  for your care. Our goal is always to provide you with excellent care. Hearing back from our patients is one way we can continue to improve our services. Please take a few minutes to complete the written survey that you may receive in the mail after your visit with us. Thank you!             Your Updated Medication List - Protect others around you: Learn how to safely use, store and throw away your medicines at www.disposemymeds.org.          This list is accurate as of 4/10/18  2:37 PM.  Always use your most recent med list.                   Brand Name Dispense Instructions for use Diagnosis    ASPIRIN EC PO      Take 81 mg by mouth daily        calcium-vitamin D 600-400 MG-UNIT per tablet    CALTRATE     Take 1 tablet by mouth 2 times daily        carbidopa-levodopa  MG per tablet    SINEMET     Take 1 tablet by mouth daily        CARDIZEM  MG 24 hr capsule   Generic drug:  diltiazem      Take 240 mg by mouth daily        cetirizine 10 MG tablet    zyrTEC     Take 10 mg by mouth daily as needed for allergies        fluticasone 220 MCG/ACT Inhaler    FLOVENT HFA     Inhale 2 puffs into the lungs 2 times daily        fluticasone 50 MCG/ACT spray    FLONASE     Spray 1 spray into both nostrils daily        IBUPROFEN PO      Take 400 mg by mouth every 6 hours as needed for moderate pain        METOPROLOL SUCCINATE ER PO      Take 50 mg by mouth daily        montelukast 10 MG tablet    SINGULAIR    30 tablet    Take 1 tablet (10 mg) by mouth At Bedtime    Seasonal allergic rhinitis, unspecified allergic rhinitis trigger, Chronic rhinitis, Nasal congestion       MULTI VITAMIN DAILY PO       Take 1 tablet by mouth daily        SIMVASTATIN PO      Take 10 mg by mouth At Bedtime        SYNTHROID PO      Take 175 mcg by mouth daily        triamcinolone 0.1 % cream    KENALOG     Apply topically 2 times daily        TYLENOL PO      Take 325 mg by mouth every 8 hours as needed for mild pain or fever

## 2018-04-12 ENCOUNTER — TELEPHONE (OUTPATIENT)
Dept: SURGERY | Facility: OTHER | Age: 68
End: 2018-04-12

## 2018-04-12 NOTE — H&P (VIEW-ONLY)
Surgery Consult Clinic Note      RE: Tamra Dc  : 1950    Chief Complaint:  Positive colon screening test    History of Present Illness:  Mrs. Dc is a very pleasant 68 year old female who I am seeing at the request of Dr. Fito Hidalgo MD for evaluation of screening colon malignant neoplasm and consideration for colonoscopy.  She denies family history of colon or rectal cancer, blood in stool, changes in bowel habits, weight loss, abdominal pain.  She did have a positive iFOB.  Her last colonoscopy was in  and positive for diverticulosis.  Abdominal surgeries include a hyseterectomy.  She specifically denies fever, chills, nausea, vomiting, chest pain, shortness of breath or palpitations.      Medical history:  HTN  Parkinson    Surgical history:  Hysterectomy     Family history:  No family history on file.    Medications:  Prior to Admission medications    Medication Sig Start Date End Date Taking? Authorizing Provider   carbidopa-levodopa (SINEMET)  MG per tablet Take 1 tablet by mouth daily 17  Yes Reported, Patient   montelukast (SINGULAIR) 10 MG tablet Take 1 tablet (10 mg) by mouth At Bedtime 17   Silva Tinajero PA-C   ASPIRIN EC PO Take 81 mg by mouth daily    Reported, Patient   calcium-vitamin D (CALTRATE) 600-400 MG-UNIT per tablet Take 1 tablet by mouth 2 times daily    Reported, Patient   cetirizine (ZYRTEC) 10 MG tablet Take 10 mg by mouth daily as needed for allergies    Reported, Patient   diltiazem (CARDIZEM CD) 240 MG 24 hr capsule Take 240 mg by mouth daily    Reported, Patient   fluticasone (FLONASE) 50 MCG/ACT spray Spray 1 spray into both nostrils daily    Reported, Patient   fluticasone (FLOVENT HFA) 220 MCG/ACT Inhaler Inhale 2 puffs into the lungs 2 times daily    Reported, Patient   IBUPROFEN PO Take 400 mg by mouth every 6 hours as needed for moderate pain    Reported, Patient   METOPROLOL SUCCINATE ER PO Take 50 mg by mouth daily    Reported, Patient  "  Multiple Vitamin (MULTI VITAMIN DAILY PO) Take 1 tablet by mouth daily    Reported, Patient   SIMVASTATIN PO Take 10 mg by mouth At Bedtime    Reported, Patient   triamcinolone (KENALOG) 0.1 % cream Apply topically 2 times daily    Reported, Patient   Levothyroxine Sodium (SYNTHROID PO) Take 175 mcg by mouth daily     Reported, Patient       Allergies:  The patientis allergic to food and no clinical screening - see comments.  .  Social history:  Social History   Substance Use Topics     Smoking status: Never Smoker     Smokeless tobacco: Not on file     Alcohol use Not on file     Marital status: .      Review of Systems:    Constitutional: Negative for fever, chills and weight loss.   HENT: Negative for ear pain, nosebleeds, congestion, sore throat, tinnitus and ear discharge.    Eyes: Negative for blurred vision, double vision, photophobia and pain.   Respiratory: Negative for cough, hemoptysis, shortness of breath, wheezing and stridor.    Cardiovascular: Negative for chest pain, palpitations and orthopnea.   Gastrointestinal: Negative for heartburn, nausea, vomiting, abdominal pain and blood in stool.   Genitourinary: Negative for urgency, frequency and hematuria.   Musculoskeletal: Negative for myalgias, back pain and joint pain.   Neurological: Negative for tingling, speech change and headaches.   Endo/Heme/Allergies: Does not bruise/bleed easily.   Psychiatric/Behavioral: Negative for depression, suicidal ideas and hallucinations. The patient is not nervous/anxious.    Physical Examination:  /68 (BP Location: Right arm, Patient Position: Chair, Cuff Size: Adult Regular)  Pulse 66  Temp 99.2  F (37.3  C) (Tympanic)  Ht 1.702 m (5' 7\")  Wt 79.4 kg (175 lb)  SpO2 97%  BMI 27.41 kg/m2  General: AAOx4, NAD, WN/WD, ambulating without assistance  HEENT:NCAT, EOMI, PERRL Sclerae anicteric; Trachea mideline, no JVD  Chest:   Clear to auscultation bilaterally.  Cardiac: S1S2 , regular rate and " rhythm without additional sounds  Abdomen: Soft, ND/NT no rebound, no guarding  Extremities: Cursory exam unremarkable.  Skin: Warm, dry, < 2 sec cap refill  Neuro: CN 2-12 grossly intact, no focal deficit, GCS 15  Psych: happy, calm, asks appropriate questions    # Pain Assessment:  Current Pain Score 3/28/2016   Pain score (0-10) 1   Tamra aguilar pain level was assessed and she currently denies pain.        Assessment/Plan:  #1 + iFOB  #2 HTN    Thank you for the consult.  Mrs. Dc and CINDY had a long and sony discussion about colonoscopies.  The indications, risks, benefits, althernatives and technical aspects of whole colon colonoscopy were outlined with risks including, but not limited to, perforation, bleeding and inability to visualize entire colon.  Management of each was reviewed including the risk for life saving surgery and possible admittance to the ICU.  The need of mechanical preparation of the colon was reviewed along with the use of monitored anesthetic care which is needed to ensure proper visualization and safety concerns should biopsy be needed.  The patient's questions were asked and answered.  Scheduled first available date.      Dr Nickerson  [facility]  [address], [address]  [place]    [address]    Referring Provider:  Fito Hidalgo MD  [facility]  [address] [place] [address]     Primary Care Provider:  Fito Hidalgo

## 2018-04-12 NOTE — TELEPHONE ENCOUNTER
Writer called to inform patient of needing a pre op for procedure on 4/18/18 with Dr. Nickerson. No voicemail set up and mobile number is [facility] number. Writer unable to contact patient at this time. Writer will try again later.    Wendie Perry

## 2018-04-18 ENCOUNTER — ANESTHESIA (OUTPATIENT)
Dept: SURGERY | Facility: HOSPITAL | Age: 68
End: 2018-04-18
Payer: MEDICARE

## 2018-04-18 ENCOUNTER — ANESTHESIA EVENT (OUTPATIENT)
Dept: SURGERY | Facility: HOSPITAL | Age: 68
End: 2018-04-18
Payer: MEDICARE

## 2018-04-18 ENCOUNTER — APPOINTMENT (OUTPATIENT)
Dept: GENERAL RADIOLOGY | Facility: HOSPITAL | Age: 68
End: 2018-04-18
Attending: SURGERY
Payer: MEDICARE

## 2018-04-18 ENCOUNTER — HOSPITAL ENCOUNTER (OUTPATIENT)
Facility: HOSPITAL | Age: 68
Discharge: HOME | End: 2018-04-18
Attending: SURGERY | Admitting: SURGERY
Payer: MEDICARE

## 2018-04-18 VITALS
SYSTOLIC BLOOD PRESSURE: 129 MMHG | WEIGHT: 167.2 LBS | DIASTOLIC BLOOD PRESSURE: 54 MMHG | BODY MASS INDEX: 26.24 KG/M2 | RESPIRATION RATE: 18 BRPM | HEIGHT: 67 IN | HEART RATE: 62 BPM | TEMPERATURE: 97.7 F | OXYGEN SATURATION: 95 %

## 2018-04-18 DIAGNOSIS — K57.30 SIGMOID DIVERTICULOSIS: Primary | ICD-10-CM

## 2018-04-18 PROCEDURE — 27210794 ZZH OR GENERAL SUPPLY STERILE: Performed by: SURGERY

## 2018-04-18 PROCEDURE — 25000128 H RX IP 250 OP 636: Performed by: NURSE ANESTHETIST, CERTIFIED REGISTERED

## 2018-04-18 PROCEDURE — 45331 SIGMOIDOSCOPY AND BIOPSY: CPT | Performed by: SURGERY

## 2018-04-18 PROCEDURE — 37000008 ZZH ANESTHESIA TECHNICAL FEE, 1ST 30 MIN: Performed by: SURGERY

## 2018-04-18 PROCEDURE — 36000050 ZZH SURGERY LEVEL 2 1ST 30 MIN: Performed by: SURGERY

## 2018-04-18 PROCEDURE — 25000125 ZZHC RX 250: Performed by: NURSE ANESTHETIST, CERTIFIED REGISTERED

## 2018-04-18 PROCEDURE — 36000052 ZZH SURGERY LEVEL 2 EA 15 ADDTL MIN: Performed by: SURGERY

## 2018-04-18 PROCEDURE — 71000027 ZZH RECOVERY PHASE 2 EACH 15 MINS: Performed by: SURGERY

## 2018-04-18 PROCEDURE — 74280 X-RAY XM COLON 2CNTRST STD: CPT | Mod: TC

## 2018-04-18 PROCEDURE — 37000009 ZZH ANESTHESIA TECHNICAL FEE, EACH ADDTL 15 MIN: Performed by: SURGERY

## 2018-04-18 PROCEDURE — 45380 COLONOSCOPY AND BIOPSY: CPT | Performed by: ANESTHESIOLOGY

## 2018-04-18 PROCEDURE — 88305 TISSUE EXAM BY PATHOLOGIST: CPT | Mod: TC | Performed by: SURGERY

## 2018-04-18 PROCEDURE — 40000306 ZZH STATISTIC PRE PROC ASSESS II: Performed by: SURGERY

## 2018-04-18 PROCEDURE — 25000128 H RX IP 250 OP 636: Performed by: ANESTHESIOLOGY

## 2018-04-18 PROCEDURE — 45380 COLONOSCOPY AND BIOPSY: CPT | Performed by: NURSE ANESTHETIST, CERTIFIED REGISTERED

## 2018-04-18 RX ORDER — ONDANSETRON 2 MG/ML
4 INJECTION INTRAMUSCULAR; INTRAVENOUS EVERY 30 MIN PRN
Status: DISCONTINUED | OUTPATIENT
Start: 2018-04-18 | End: 2018-04-18 | Stop reason: HOSPADM

## 2018-04-18 RX ORDER — LIDOCAINE 40 MG/G
CREAM TOPICAL
Status: DISCONTINUED | OUTPATIENT
Start: 2018-04-18 | End: 2018-04-18 | Stop reason: HOSPADM

## 2018-04-18 RX ORDER — NALOXONE HYDROCHLORIDE 0.4 MG/ML
.1-.4 INJECTION, SOLUTION INTRAMUSCULAR; INTRAVENOUS; SUBCUTANEOUS
Status: DISCONTINUED | OUTPATIENT
Start: 2018-04-18 | End: 2018-04-18 | Stop reason: HOSPADM

## 2018-04-18 RX ORDER — DEXAMETHASONE SODIUM PHOSPHATE 4 MG/ML
4 INJECTION, SOLUTION INTRA-ARTICULAR; INTRALESIONAL; INTRAMUSCULAR; INTRAVENOUS; SOFT TISSUE EVERY 10 MIN PRN
Status: DISCONTINUED | OUTPATIENT
Start: 2018-04-18 | End: 2018-04-18 | Stop reason: HOSPADM

## 2018-04-18 RX ORDER — SODIUM CHLORIDE, SODIUM LACTATE, POTASSIUM CHLORIDE, CALCIUM CHLORIDE 600; 310; 30; 20 MG/100ML; MG/100ML; MG/100ML; MG/100ML
INJECTION, SOLUTION INTRAVENOUS CONTINUOUS
Status: DISCONTINUED | OUTPATIENT
Start: 2018-04-18 | End: 2018-04-18 | Stop reason: HOSPADM

## 2018-04-18 RX ORDER — FENTANYL CITRATE 50 UG/ML
25-50 INJECTION, SOLUTION INTRAMUSCULAR; INTRAVENOUS
Status: DISCONTINUED | OUTPATIENT
Start: 2018-04-18 | End: 2018-04-18 | Stop reason: HOSPADM

## 2018-04-18 RX ORDER — FENTANYL CITRATE 50 UG/ML
INJECTION, SOLUTION INTRAMUSCULAR; INTRAVENOUS PRN
Status: DISCONTINUED | OUTPATIENT
Start: 2018-04-18 | End: 2018-04-18

## 2018-04-18 RX ORDER — PROPOFOL 10 MG/ML
INJECTION, EMULSION INTRAVENOUS PRN
Status: DISCONTINUED | OUTPATIENT
Start: 2018-04-18 | End: 2018-04-18

## 2018-04-18 RX ORDER — ONDANSETRON 4 MG/1
4 TABLET, ORALLY DISINTEGRATING ORAL EVERY 30 MIN PRN
Status: DISCONTINUED | OUTPATIENT
Start: 2018-04-18 | End: 2018-04-18 | Stop reason: HOSPADM

## 2018-04-18 RX ORDER — FENTANYL CITRATE 50 UG/ML
25-50 INJECTION, SOLUTION INTRAMUSCULAR; INTRAVENOUS
Status: CANCELLED | OUTPATIENT
Start: 2018-04-18

## 2018-04-18 RX ORDER — LABETALOL HYDROCHLORIDE 5 MG/ML
10 INJECTION, SOLUTION INTRAVENOUS
Status: CANCELLED | OUTPATIENT
Start: 2018-04-18

## 2018-04-18 RX ORDER — HYDRALAZINE HYDROCHLORIDE 20 MG/ML
2.5-5 INJECTION INTRAMUSCULAR; INTRAVENOUS EVERY 10 MIN PRN
Status: CANCELLED | OUTPATIENT
Start: 2018-04-18

## 2018-04-18 RX ORDER — ALBUTEROL SULFATE 0.83 MG/ML
2.5 SOLUTION RESPIRATORY (INHALATION) EVERY 4 HOURS PRN
Status: CANCELLED | OUTPATIENT
Start: 2018-04-18

## 2018-04-18 RX ORDER — OXYCODONE HYDROCHLORIDE 5 MG/1
5 TABLET ORAL EVERY 4 HOURS PRN
Status: DISCONTINUED | OUTPATIENT
Start: 2018-04-18 | End: 2018-04-18 | Stop reason: HOSPADM

## 2018-04-18 RX ORDER — LIDOCAINE HYDROCHLORIDE 20 MG/ML
INJECTION, SOLUTION INFILTRATION; PERINEURAL PRN
Status: DISCONTINUED | OUTPATIENT
Start: 2018-04-18 | End: 2018-04-18

## 2018-04-18 RX ADMIN — PROPOFOL 10 MG: 10 INJECTION, EMULSION INTRAVENOUS at 08:29

## 2018-04-18 RX ADMIN — PROPOFOL 10 MG: 10 INJECTION, EMULSION INTRAVENOUS at 08:26

## 2018-04-18 RX ADMIN — PROPOFOL 10 MG: 10 INJECTION, EMULSION INTRAVENOUS at 08:33

## 2018-04-18 RX ADMIN — PROPOFOL 10 MG: 10 INJECTION, EMULSION INTRAVENOUS at 08:12

## 2018-04-18 RX ADMIN — PROPOFOL 10 MG: 10 INJECTION, EMULSION INTRAVENOUS at 08:14

## 2018-04-18 RX ADMIN — PROPOFOL 30 MG: 10 INJECTION, EMULSION INTRAVENOUS at 08:07

## 2018-04-18 RX ADMIN — PROPOFOL 30 MG: 10 INJECTION, EMULSION INTRAVENOUS at 08:08

## 2018-04-18 RX ADMIN — PROPOFOL 10 MG: 10 INJECTION, EMULSION INTRAVENOUS at 08:20

## 2018-04-18 RX ADMIN — MIDAZOLAM 1 MG: 1 INJECTION INTRAMUSCULAR; INTRAVENOUS at 08:16

## 2018-04-18 RX ADMIN — PROPOFOL 10 MG: 10 INJECTION, EMULSION INTRAVENOUS at 08:37

## 2018-04-18 RX ADMIN — FENTANYL CITRATE 50 MCG: 50 INJECTION, SOLUTION INTRAMUSCULAR; INTRAVENOUS at 08:16

## 2018-04-18 RX ADMIN — PROPOFOL 10 MG: 10 INJECTION, EMULSION INTRAVENOUS at 08:18

## 2018-04-18 RX ADMIN — PROPOFOL 10 MG: 10 INJECTION, EMULSION INTRAVENOUS at 08:22

## 2018-04-18 RX ADMIN — PROPOFOL 10 MG: 10 INJECTION, EMULSION INTRAVENOUS at 08:16

## 2018-04-18 RX ADMIN — PROPOFOL 10 MG: 10 INJECTION, EMULSION INTRAVENOUS at 08:39

## 2018-04-18 RX ADMIN — LIDOCAINE HYDROCHLORIDE 40 MG: 20 INJECTION, SOLUTION INFILTRATION; PERINEURAL at 08:07

## 2018-04-18 RX ADMIN — PROPOFOL 10 MG: 10 INJECTION, EMULSION INTRAVENOUS at 08:35

## 2018-04-18 RX ADMIN — PROPOFOL 10 MG: 10 INJECTION, EMULSION INTRAVENOUS at 08:24

## 2018-04-18 RX ADMIN — PROPOFOL 10 MG: 10 INJECTION, EMULSION INTRAVENOUS at 08:31

## 2018-04-18 RX ADMIN — MIDAZOLAM 1 MG: 1 INJECTION INTRAMUSCULAR; INTRAVENOUS at 08:04

## 2018-04-18 RX ADMIN — FENTANYL CITRATE 50 MCG: 50 INJECTION, SOLUTION INTRAMUSCULAR; INTRAVENOUS at 08:04

## 2018-04-18 RX ADMIN — SODIUM CHLORIDE, POTASSIUM CHLORIDE, SODIUM LACTATE AND CALCIUM CHLORIDE: 600; 310; 30; 20 INJECTION, SOLUTION INTRAVENOUS at 07:35

## 2018-04-18 RX ADMIN — GLUCAGON HYDROCHLORIDE 1 MG: KIT at 08:22

## 2018-04-18 ASSESSMENT — ENCOUNTER SYMPTOMS: DYSRHYTHMIAS: 1

## 2018-04-18 NOTE — OR NURSING
Patient and responsible adult given discharge instructions with no questions regarding instructions. Taj score 20. Pain level 0/10.  Discharged from unit via ambulatory. Patient discharged to xray.

## 2018-04-18 NOTE — IP AVS SNAPSHOT
MRN:[id number]                      After Visit Summary   4/18/2018    Tamra Dc    MRN: [id number]           Thank you!     Thank you for choosing [facility] for your care. Our goal is always to provide you with excellent care. Hearing back from our patients is one way we can continue to improve our services. Please take a few minutes to complete the written survey that you may receive in the mail after you visit with us. Thank you!        Patient Information     Date Of Birth          1950        About your hospital stay     You were admitted on:  April 18, 2018 You last received care in the:  [facility] Preop/Phase II    You were discharged on:  April 18, 2018       Who to Call     For medical emergencies, please call 911.  For non-urgent questions about your medical care, please call your primary care provider or clinic, [phone]  For questions related to your surgery, please call your surgery clinic        Attending Provider     Provider Specialty    Rashad Nickerson, DO Surgery       Primary Care Provider Office Phone # Fax #    Fito Hidalgo [phone]      Your next 10 appointments already scheduled     Apr 24, 2018  2:00 PM CDT   DX HIP/PELVIS/SPINE with HIDX1   HI DEXA ([facility] )    [address] [facility] [address] [phone]           Please do not take any of the following 24 hours prior to the day of your exam: vitamins, calcium tablets, antacids.  If possible, please wear clothes without metal (snaps, zippers). A sweatsuit works well.              Future tests that were ordered for you     XR Colon Air contrast                 Further instructions from your care team           INSTRUCTIONS AFTER COLONOSCOPY    WHEN YOU ARE BACK HOME:    Plan to rest for an hour or two after you get home.    You may have some cramping or pressure until you pass gas.    You may resume your regular medications.    Eat a small, light meal at first, and  then gradually return to normal meal sizes.  If you had a polyp removed:    Slight bleeding may occur.  You may have a slight blood stain on the toilet paper after a bowel movement.    To lessen the chance of bleeding, avoid heavy exercise for ONE WEEK.  This includes heavy lifting, vigorous sport activities, and heavy physical labor.  You may resume your normal sexual activity.      Avoid aspirin or aspirin products if instructed by your doctor.    WHAT TO WATCH FOR:  Problems rarely occur after the exam; however, it is important for you to watch for early signs of possible problems.  If you have     Unusual pain in your abdomen    Nausea and vomiting that persists    Excessive bleeding    Black or bloody bowel movements    Fever or temperature above 100.6 F  Please call your doctor ([facility] [phone]) or go to the nearest hospital emergency room.    Post-Anesthesia Patient Instructions    IMMEDIATELY FOLLOWING SURGERY:  Do not drive or operate machinery for the first twenty four hours after surgery.  Do not make any important decisions for twenty four hours after surgery or while taking narcotic pain medications or sedatives.  If you develop intractable nausea and vomiting or a severe headache please notify your doctor immediately.    FOLLOW-UP:  Please make an appointment with your surgeon as instructed. You do not need to follow up with anesthesia unless specifically instructed to do so.    WOUND CARE INSTRUCTIONS (if applicable):  Keep a dry clean dressing on the anesthesia/puncture wound site if there is drainage.  Once the wound has quit draining you may leave it open to air.  Generally you should leave the bandage intact for twenty four hours unless there is drainage.  If the epidural site drains for more than 36-48 hours please call the anesthesia department.    QUESTIONS?:  Please feel free to call your physician or the hospital  if you have any questions, and they will be happy to assist  "you.       Pending Results     No orders found from 2018 to 2018.            Admission Information     Date & Time Provider Department Dept. Phone    2018 Rashad Nickerson,  HI Preop/Phase -[phone]      Your Vitals Were     Blood Pressure Pulse Temperature Respirations Height Weight    148/61 62 97.7  F (36.5  C) (Oral) 16 1.702 m (5' 7\") 75.8 kg (167 lb 3.2 oz)    Pulse Oximetry BMI (Body Mass Index)                97% 26.19 kg/m2          [facility] Information     [employer] lets you send messages to your doctor, view your test results, renew your prescriptions, schedule appointments and more. To sign up, go to [email or web address] . Click on \"Log in\" on the left side of the screen, which will take you to the Welcome page. Then click on \"Sign up Now\" on the right side of the page.     You will be asked to enter the access code listed below, as well as some personal information. Please follow the directions to create your username and password.     Your access code is: [email or web address]  Expires: 2018  2:37 PM     Your access code will  in 90 days. If you need help or a new code, please call your [facility] clinic or [phone].        Care EveryWhere ID     This is your Care EveryWhere ID. This could be used by other organizations to access your [facility] medical records  [id number]        Equal Access to Services     LESTER JONES AH: Hadii lobito lora hadasho Sowilmarali, waaxda luqadaha, qaybta kaalmada stephane, juliet perez . [facility] [phone].    ATENCIÓN: Si habla español, tiene a johns disposición servicios gratuitos de asistencia lingüística. Marvin al [phone].    We comply with applicable federal civil rights laws and [place] laws. We do not discriminate on the basis of race, color, national origin, age, disability, sex, sexual orientation, or gender identity.               Review of your medicines      CONTINUE these medicines which have NOT CHANGED "        Dose / Directions    ASPIRIN EC PO        Dose:  81 mg   Take 81 mg by mouth daily   Refills:  0       calcium-vitamin D 600-400 MG-UNIT per tablet   Commonly known as:  CALTRATE        Dose:  1 tablet   Take 1 tablet by mouth 2 times daily   Refills:  0       carbidopa-levodopa  MG per tablet   Commonly known as:  SINEMET        Dose:  1 tablet   Take 1 tablet by mouth daily   Refills:  0       CARDIZEM  MG 24 hr capsule   Generic drug:  diltiazem        Dose:  240 mg   Take 240 mg by mouth daily   Refills:  0       cetirizine 10 MG tablet   Commonly known as:  zyrTEC        Dose:  10 mg   Take 10 mg by mouth daily as needed for allergies   Refills:  0       fluticasone 220 MCG/ACT Inhaler   Commonly known as:  FLOVENT HFA        Dose:  2 puff   Inhale 2 puffs into the lungs 2 times daily   Refills:  0       fluticasone 50 MCG/ACT spray   Commonly known as:  FLONASE        Dose:  1 spray   Spray 1 spray into both nostrils daily   Refills:  0       IBUPROFEN PO        Dose:  400 mg   Take 400 mg by mouth every 6 hours as needed for moderate pain   Refills:  0       METOPROLOL SUCCINATE ER PO        Dose:  100 mg   Take 100 mg by mouth daily   Refills:  0       montelukast 10 MG tablet   Commonly known as:  SINGULAIR   Used for:  Seasonal allergic rhinitis, unspecified allergic rhinitis trigger, Chronic rhinitis, Nasal congestion        Dose:  10 mg   Take 1 tablet (10 mg) by mouth At Bedtime   Quantity:  30 tablet   Refills:  11       MULTI VITAMIN DAILY PO        Dose:  1 tablet   Take 1 tablet by mouth daily   Refills:  0       SIMVASTATIN PO        Dose:  10 mg   Take 10 mg by mouth At Bedtime   Refills:  0       SYNTHROID PO        Dose:  175 mcg   Take 175 mcg by mouth daily   Refills:  0       triamcinolone 0.1 % cream   Commonly known as:  KENALOG        Apply topically 2 times daily   Refills:  0       TYLENOL PO        Dose:  325 mg   Take 325 mg by mouth every 8 hours as needed for mild  pain or fever   Refills:  0         STOP taking     Na Sulfate-K Sulfate-Mg Sulf solution   Commonly known as:  SUPREP BOWEL PREP                    Protect others around you: Learn how to safely use, store and throw away your medicines at www.disposemymeds.org.             Medication List: This is a list of all your medications and when to take them. Check marks below indicate your daily home schedule. Keep this list as a reference.      Medications           Morning Afternoon Evening Bedtime As Needed    ASPIRIN EC PO   Take 81 mg by mouth daily                                calcium-vitamin D 600-400 MG-UNIT per tablet   Commonly known as:  CALTRATE   Take 1 tablet by mouth 2 times daily                                carbidopa-levodopa  MG per tablet   Commonly known as:  SINEMET   Take 1 tablet by mouth daily                                CARDIZEM  MG 24 hr capsule   Take 240 mg by mouth daily   Generic drug:  diltiazem                                cetirizine 10 MG tablet   Commonly known as:  zyrTEC   Take 10 mg by mouth daily as needed for allergies                                fluticasone 220 MCG/ACT Inhaler   Commonly known as:  FLOVENT HFA   Inhale 2 puffs into the lungs 2 times daily                                fluticasone 50 MCG/ACT spray   Commonly known as:  FLONASE   Spray 1 spray into both nostrils daily                                IBUPROFEN PO   Take 400 mg by mouth every 6 hours as needed for moderate pain                                METOPROLOL SUCCINATE ER PO   Take 100 mg by mouth daily                                montelukast 10 MG tablet   Commonly known as:  SINGULAIR   Take 1 tablet (10 mg) by mouth At Bedtime                                MULTI VITAMIN DAILY PO   Take 1 tablet by mouth daily                                SIMVASTATIN PO   Take 10 mg by mouth At Bedtime                                SYNTHROID PO   Take 175 mcg by mouth daily                                 triamcinolone 0.1 % cream   Commonly known as:  KENALOG   Apply topically 2 times daily                                TYLENOL PO   Take 325 mg by mouth every 8 hours as needed for mild pain or fever

## 2018-04-18 NOTE — IP AVS SNAPSHOT
HI Preop/Phase II    [address] [address]    Phone:  [phone]                                       After Visit Summary   4/18/2018    Tamra Dc    MRN: [id number]           After Visit Summary Signature Page     I have received my discharge instructions, and my questions have been answered. I have discussed any challenges I see with this plan with the nurse or doctor.    ..........................................................................................................................................  Patient/Patient Representative Signature      ..........................................................................................................................................  Patient Representative Print Name and Relationship to Patient    ..................................................               ................................................  Date                                            Time    ..........................................................................................................................................  Reviewed by Signature/Title    ...................................................              ..............................................  Date                                                            Time

## 2018-04-18 NOTE — ANESTHESIA CARE TRANSFER NOTE
Patient: Tamra Dc    Procedure(s):  FLEXIBLE  SIGMOIDOSCOPY WITH POLYPECTOMY - Wound Class: II-Clean Contaminated    Diagnosis: OCCULT BLOOD POSITIVE STOOL  Diagnosis Additional Information: No value filed.    Anesthesia Type:   MAC     Note:  Airway :Nasal Cannula  Patient transferred to:Phase II  Handoff Report: Identifed the Patient, Identified the Reponsible Provider, Reviewed the pertinent medical history, Discussed the surgical course, Reviewed Intra-OP anesthesia mangement and issues during anesthesia, Set expectations for post-procedure period and Allowed opportunity for questions and acknowledgement of understanding      Vitals: (Last set prior to Anesthesia Care Transfer)    CRNA VITALS  4/18/2018 0813 - 4/18/2018 0846      4/18/2018             Pulse: 65    Ht Rate: 65    SpO2: 98 %    Resp Rate (set): 8                Electronically Signed By: LILIBETH Ross CRNA  April 18, 2018  8:46 AM

## 2018-04-18 NOTE — DISCHARGE INSTRUCTIONS
INSTRUCTIONS AFTER COLONOSCOPY    WHEN YOU ARE BACK HOME:    Plan to rest for an hour or two after you get home.    You may have some cramping or pressure until you pass gas.    You may resume your regular medications.    Eat a small, light meal at first, and then gradually return to normal meal sizes.  If you had a polyp removed:    Slight bleeding may occur.  You may have a slight blood stain on the toilet paper after a bowel movement.    To lessen the chance of bleeding, avoid heavy exercise for ONE WEEK.  This includes heavy lifting, vigorous sport activities, and heavy physical labor.  You may resume your normal sexual activity.      Avoid aspirin or aspirin products if instructed by your doctor.    WHAT TO WATCH FOR:  Problems rarely occur after the exam; however, it is important for you to watch for early signs of possible problems.  If you have     Unusual pain in your abdomen    Nausea and vomiting that persists    Excessive bleeding    Black or bloody bowel movements    Fever or temperature above 100.6 F  Please call your doctor ([facility] [phone]) or go to the nearest hospital emergency room.    Post-Anesthesia Patient Instructions    IMMEDIATELY FOLLOWING SURGERY:  Do not drive or operate machinery for the first twenty four hours after surgery.  Do not make any important decisions for twenty four hours after surgery or while taking narcotic pain medications or sedatives.  If you develop intractable nausea and vomiting or a severe headache please notify your doctor immediately.    FOLLOW-UP:  Please make an appointment with your surgeon as instructed. You do not need to follow up with anesthesia unless specifically instructed to do so.    WOUND CARE INSTRUCTIONS (if applicable):  Keep a dry clean dressing on the anesthesia/puncture wound site if there is drainage.  Once the wound has quit draining you may leave it open to air.  Generally you should leave the bandage intact for twenty four  hours unless there is drainage.  If the epidural site drains for more than 36-48 hours please call the anesthesia department.    QUESTIONS?:  Please feel free to call your physician or the hospital  if you have any questions, and they will be happy to assist you.

## 2018-04-18 NOTE — ANESTHESIA POSTPROCEDURE EVALUATION
Patient: Tamra Dc    Procedure(s):  FLEXIBLE  SIGMOIDOSCOPY WITH POLYPECTOMY - Wound Class: II-Clean Contaminated    Diagnosis:OCCULT BLOOD POSITIVE STOOL  Diagnosis Additional Information: No value filed.    Anesthesia Type:  MAC    Note:  Anesthesia Post Evaluation    Patient location during evaluation: Phase 2 and Bedside  Patient participation: Able to fully participate in evaluation  Level of consciousness: awake and alert  Pain management: adequate  Airway patency: patent  Cardiovascular status: acceptable  Respiratory status: acceptable  Hydration status: stable  PONV: none     Anesthetic complications: None          Last vitals:  Vitals:    04/18/18 1030 04/18/18 1035 04/18/18 1100   BP: 111/51 129/54    Pulse:      Resp: 18 18    Temp:      SpO2: 97% 97% 95%         Electronically Signed By: Bruce Cade MD  April 18, 2018  11:07 AM

## 2018-04-18 NOTE — BRIEF OP NOTE
[facility] - Brief Operative Note    Pre-operative diagnosis: Positive colon screening test   Post-operative diagnosis Diverticulosis, mid rectal polyp, internal hemorrhoids   Procedure: Flexible sigmoidoscopy    Surgeon: Rashad Nickerson DO   Anesthesia: Monitor Anesthesia Care    Estimated blood loss: 0   Blood transfusion: No transfusion was given during surgery   Drains: 0   Specimens: Rectal polyp   Findings: Severe sigmoid diverticulosis, unable to advance colonoscope past 50cm, small sessile polyp mid rectum, hemorrhoids   Complications: None   Condition: Stable   Comments: Details included in dictated operative note.

## 2018-04-18 NOTE — ANESTHESIA PREPROCEDURE EVALUATION
Anesthesia Evaluation     . Pt has had prior anesthetic.     No history of anesthetic complications          ROS/MED HX    ENT/Pulmonary:     (+)allergic rhinitis, , . .    Neurologic:     (+)Parkinson's disease     Cardiovascular:     (+) Dyslipidemia, hypertension-range: on beta blocker (metoprolol) , ---. : . . . :. dysrhythmias (supraventricular tachycardia ) Other, . Previous cardiac testing date:results:date: results:ECG reviewed date:4/10/2018 results:NSR@61, ?inferior infarct age undetermined date: results:          METS/Exercise Tolerance:     Hematologic:     (+) Anemia, -      Musculoskeletal:   (+) arthritis, , , other musculoskeletal- DJD      GI/Hepatic:     (+) bowel prep, Other GI/Hepatic OCCULT BLOOD POSITIVE STOOL, diverticulosis      Renal/Genitourinary:  - ROS Renal section negative       Endo:     (+) thyroid problem hypothyroidism, .      Psychiatric:  - neg psychiatric ROS       Infectious Disease:  - neg infectious disease ROS       Malignancy:   (+) Malignancy History of Breast          Other:    - neg other ROS                 Physical Exam  Normal systems: dental    Airway   Mallampati: IV  TM distance: >3 FB  Neck ROM: full    Dental     Cardiovascular   Rhythm and rate: regular and normal      Pulmonary    breath sounds clear to auscultation                    Anesthesia Plan      History & Physical Review  History and physical reviewed and following examination; no interval change.    ASA Status:  3 .    NPO Status:  > 8 hours    Plan for MAC with Intravenous and Propofol induction. Maintenance will be TIVA.  Reason for MAC:  Chronic cardiopulmonary disease (G9) and Other - see comments  PONV prophylaxis:  Ondansetron (or other 5HT-3)  Surgeon requests deep sedation. Patient is an ASA 3. Will provide MAC.      Postoperative Care  Postoperative pain management:  IV analgesics.      Consents  Anesthetic plan, risks, benefits and alternatives discussed with:  Patient..                           .

## 2018-04-18 NOTE — PROCEDURES
Procedure Date: 04/18/2018      DATE OF SERVICE:  04/18/2018      PREOPERATIVE DIAGNOSIS:  Positive colon malignant screening test.      POSTOPERATIVE DIAGNOSIS:  Diverticulosis, mid rectal polyp, internal hemorrhoids.        PROCEDURE:  Flexible sigmoidoscopy.      INDICATION:  Positive colon screening test.      SURGEON:  Maia Nickerson DO      DESCRIPTION OF PROCEDURE:  The patient was brought into the endoscopy suite and placed in the left lateral decubitus position.  After preprocedural pause and attended monitored anesthesia was administered, the external anus was inspected and was normal.  Digital rectal exam was positive for hemorrhoids.  The colonoscope was inserted and advanced with a great deal of difficulty through the sigmoid colon.  There were multiple large-mouth diverticula in the sigmoid colon.  At 50 cm from the anal verge there was an acute angulation of the colon that I could not safely advance the colonoscope past.  Multiple attempts were tried with abdominal pressure, patient on her back, patient on her right side.  I then removed the scope and attempted with the pediatric scope.  Again at 50 cm I encountered that acute angulation that again in multiple patient positions and multiple person abdominal pressure I just could not advance past this corner.  At this point I felt the risk of perforation was too high to proceed any further.  The colonoscope then was slowly withdrawn evaluating the length of the sigmoid colon that I was able to get into as well as the rectum.  At the mid rectum a small sessile polyp was removed using cold biting forceps.  Retroflexion in the rectum was positive for grade 1 internal hemorrhoids.  Extra air was removed from the colon and the colonoscope withdrawn.  The patient tolerated the procedure well and then was referred for barium enema.  Timing for interval colonoscopy will depend upon the histologic evaluation of the polyp.         MAIA NICKERSON DO              D: 2018   T: 2018   MT: APRIL      Name:     LISA ERICKSON   MRN:      [id number]        Account:        [id number]   :      1950           Procedure Date: 2018      Document: [id number]

## 2018-04-19 LAB — COPATH REPORT: NORMAL

## 2018-04-26 ENCOUNTER — APPOINTMENT (OUTPATIENT)
Dept: GENERAL RADIOLOGY | Facility: HOSPITAL | Age: 68
End: 2018-04-26
Attending: PHYSICIAN ASSISTANT
Payer: MEDICARE

## 2018-04-26 ENCOUNTER — HOSPITAL ENCOUNTER (EMERGENCY)
Facility: HOSPITAL | Age: 68
Discharge: HOME | End: 2018-04-26
Attending: PHYSICIAN ASSISTANT | Admitting: PHYSICIAN ASSISTANT
Payer: MEDICARE

## 2018-04-26 ENCOUNTER — HOSPITAL ENCOUNTER (OUTPATIENT)
Dept: BONE DENSITY | Facility: HOSPITAL | Age: 68
Discharge: HOME | End: 2018-04-26
Attending: FAMILY MEDICINE | Admitting: FAMILY MEDICINE
Payer: MEDICARE

## 2018-04-26 VITALS
WEIGHT: 170 LBS | OXYGEN SATURATION: 96 % | RESPIRATION RATE: 16 BRPM | DIASTOLIC BLOOD PRESSURE: 61 MMHG | HEART RATE: 72 BPM | TEMPERATURE: 98.2 F | BODY MASS INDEX: 26.63 KG/M2 | SYSTOLIC BLOOD PRESSURE: 176 MMHG

## 2018-04-26 DIAGNOSIS — S80.211A KNEE ABRASION, RIGHT, INITIAL ENCOUNTER: ICD-10-CM

## 2018-04-26 DIAGNOSIS — E28.39 ESTROGEN DEFICIENCY: ICD-10-CM

## 2018-04-26 DIAGNOSIS — S63.256A DISLOCATION OF RIGHT LITTLE FINGER, INITIAL ENCOUNTER: ICD-10-CM

## 2018-04-26 PROCEDURE — G0463 HOSPITAL OUTPT CLINIC VISIT: HCPCS

## 2018-04-26 PROCEDURE — 40000268 ZZH STATISTIC NO CHARGES

## 2018-04-26 PROCEDURE — 73140 X-RAY EXAM OF FINGER(S): CPT | Mod: TC,RT

## 2018-04-26 PROCEDURE — 99212 OFFICE O/P EST SF 10 MIN: CPT | Mod: 25 | Performed by: PHYSICIAN ASSISTANT

## 2018-04-26 PROCEDURE — 26770 TREAT FINGER DISLOCATION: CPT | Performed by: PHYSICIAN ASSISTANT

## 2018-04-26 PROCEDURE — G0463 HOSPITAL OUTPT CLINIC VISIT: HCPCS | Mod: 25

## 2018-04-26 PROCEDURE — 77080 DXA BONE DENSITY AXIAL: CPT | Mod: TC

## 2018-04-26 PROCEDURE — 26770 TREAT FINGER DISLOCATION: CPT

## 2018-04-26 ASSESSMENT — ENCOUNTER SYMPTOMS
WOUND: 1
CONSTITUTIONAL NEGATIVE: 1
ARTHRALGIAS: 1
NECK PAIN: 0
PSYCHIATRIC NEGATIVE: 1
NECK STIFFNESS: 0
NEUROLOGICAL NEGATIVE: 1
CARDIOVASCULAR NEGATIVE: 1

## 2018-04-26 NOTE — ED PROVIDER NOTES
"  History     Chief Complaint   Patient presents with     Hand Pain     \"little finger right hand bent from a fall on the cement, landing on right knee and hand today\"     The history is provided by the patient. No  was used.     Tamra Dc is a 68 year old female who has right 5th finger pain and obvious deformity. Has abrasion to right knee. Pt just fell. Did not hit her head. No neck pain. Tripped on uneven pavement    Problem List:    Patient Active Problem List    Diagnosis Date Noted     Epidermal inclusion cyst 01/23/2018     Priority: Medium     Parkinson disease (H) 01/22/2018     Priority: Medium     Primary osteoarthritis of both knees 09/13/2016     Priority: Medium     Seborrheic keratosis 03/04/2013     Priority: Medium     Osteopenia 06/08/2009     Priority: Medium     Sustained supraventricular tachycardia (H) 02/18/2009     Priority: Medium     Essential hypertension 01/28/2009     Priority: Medium     Overview:   IMO Update       Blood glucose abnormal 06/18/2007     Priority: Medium     Overview:   IMO Update 10/11       Allergic state 01/10/2007     Priority: Medium     Overview:   IMO Update 10/11       Malignant neoplasm of upper-outer quadrant of female breast (H) 06/08/2006     Priority: Medium     Hyperlipidemia 06/05/2006     Priority: Medium     Overview:   IMO Update 10/11       Hypothyroidism 06/05/2006     Priority: Medium     Overview:   IMO Update 10/11          Past Medical History:    No past medical history on file.    Past Surgical History:    Past Surgical History:   Procedure Laterality Date     COLONOSCOPY N/A 4/18/2018    Procedure: COLONOSCOPY;  FLEXIBLE  SIGMOIDOSCOPY WITH POLYPECTOMY;  Surgeon: Rashad Nickerson DO;  Location: [facility]       Family History:    No family history on file.    Social History:  Marital Status:   [2]  Social History   Substance Use Topics     Smoking status: Never Smoker     Smokeless tobacco: Never Used     Alcohol " use Not on file        Medications:      Acetaminophen (TYLENOL PO)   ASPIRIN EC PO   calcium-vitamin D (CALTRATE) 600-400 MG-UNIT per tablet   carbidopa-levodopa (SINEMET)  MG per tablet   cetirizine (ZYRTEC) 10 MG tablet   diltiazem (CARDIZEM CD) 240 MG 24 hr capsule   fluticasone (FLONASE) 50 MCG/ACT spray   fluticasone (FLOVENT HFA) 220 MCG/ACT Inhaler   IBUPROFEN PO   Levothyroxine Sodium (SYNTHROID PO)   METOPROLOL SUCCINATE ER PO   montelukast (SINGULAIR) 10 MG tablet   Multiple Vitamin (MULTI VITAMIN DAILY PO)   SIMVASTATIN PO   triamcinolone (KENALOG) 0.1 % cream         Review of Systems   Constitutional: Negative.    HENT: Negative.    Cardiovascular: Negative.    Musculoskeletal: Positive for arthralgias. Negative for neck pain and neck stiffness.   Skin: Positive for wound.   Neurological: Negative.    Psychiatric/Behavioral: Negative.        Physical Exam   BP: 176/61  Pulse: 72  Temp: 98.2  F (36.8  C)  Resp: 16  Weight: 77.1 kg (170 lb)  SpO2: 96 %      Physical Exam   Constitutional: She is oriented to person, place, and time. She appears well-developed and well-nourished. No distress.   Cardiovascular: Normal rate.    Pulmonary/Chest: Effort normal.   Musculoskeletal:   Right 5th finger: at PIP joint finger turns laterally approx 80 degrees. M/n/v intact. I offered the pt to have a digital block before having me reduce the dislocation. Pt chose not to have a block. I was able to easily reduce the dislocation. M/n/v intact. +AFROM, 5/5 strength. Mild TTP to the PIP joint. Finger splint applied. Pt tolerated well.    Right knee: minor abrasion. No TTP. Applied antibiotic ointment, 4x4 gauze and loose coban   Neurological: She is alert and oriented to person, place, and time.   Skin: She is not diaphoretic.   Psychiatric: She has a normal mood and affect.   Nursing note and vitals reviewed.      ED Course     ED Course     Procedures            Results for orders placed or performed during the  hospital encounter of 04/26/18 (from the past 24 hour(s))   XR Finger Right G/E 2 Views    Narrative    PROCEDURE:  XR FINGER RT G/E 2 VW    HISTORY: fell;     COMPARISON:  None.    TECHNIQUE:  2 views of the right fifth finger were obtained.    FINDINGS:  No fracture or dislocation is identified. The joint spaces  are preserved.        Impression    IMPRESSION: No acute fracture.      PACO CALLAHAN MD       Assessments & Plan (with Medical Decision Making)     I have reviewed the nursing notes.    I have reviewed the findings, diagnosis, plan and need for follow up with the patient.      Final diagnoses:   Dislocation of right little finger, initial encounter - Reduced.  Xrays negative   Knee abrasion, right, initial encounter       Wear splint for comfort and support  Patient verbally educated and given appropriate education sheets for the diagnoses and has no questions.  Follow up with your Primary Care provider if symptoms increase or if concerns develop, return to the ER  Dasha Macdonald Certified  Physician Assistant  4/26/2018  8:16 PM  URGENT CARE CLINIC      4/26/2018   [facility]     Dasha Macdonald PA  04/26/18 2016       Dasha Macdonald PA  04/26/18 9027

## 2018-04-26 NOTE — ED NOTES
Patient presents with a little finger that is sticking out sideways from hand.  Patient fell today on the side walk.

## 2018-04-26 NOTE — ED AVS SNAPSHOT
[facility]    [address] [place] [address]    [place] [address]    Phone:  [phone]                                       Tamra Dc   MRN: [id number]    Department:  [facility]   Date of Visit:  4/26/2018           After Visit Summary Signature Page     I have received my discharge instructions, and my questions have been answered. I have discussed any challenges I see with this plan with the nurse or doctor.    ..........................................................................................................................................  Patient/Patient Representative Signature      ..........................................................................................................................................  Patient Representative Print Name and Relationship to Patient    ..................................................               ................................................  Date                                            Time    ..........................................................................................................................................  Reviewed by Signature/Title    ...................................................              ..............................................  Date                                                            Time

## 2018-04-26 NOTE — ED AVS SNAPSHOT
[facility]    [address] [address]    Phone:  [phone]                                       Tamra Dc   MRN: [id number]    Department:  [facility]   Date of Visit:  4/26/2018           Patient Information     Date Of Birth          1950        Your diagnoses for this visit were:     Dislocation of right little finger, initial encounter Reduced.  Xrays negative    Knee abrasion, right, initial encounter        You were seen by Dasha Macdonald PA.      Follow-up Information     Follow up with Fito Hidalgo MD.    Specialty:  Family Practice    Why:  If symptoms worsen    Contact information:    [facility]  [address] [address] [phone]          Follow up with [facility].    Specialty:  EMERGENCY MEDICINE    Why:  If further concerns develop    Contact information:    [address] [address] [phone]    Additional information:    From [facility]: Take [address]. Turn left at [address]. Turn left at the first stoplight on [address]. At the first stop sign, take a right onto [address]. Take a left into the parking lot and continue through until you reach the North enterance of the building.       From [place]: Take [address]. Take the [address] ramp towards [place]. Turn left onto [address]. Take a slight right onto [address]. Turn left at the first stoplight on [address]. At the first stop sign, take a right onto [address]. Take a left into the parking lot and continue through until you reach the North enterance of the building.       From [place]: Take [address]. Take a right at [address]. At the first stop sign, take a right onto [address]. Take a left into the parking lot and continue through until you reach the North enterance of the building.       Discharge  References/Attachments     ABRASIONS (ENGLISH)    DISLOCATION, FINGER (ENGLISH)      Your next 10 appointments already scheduled     Apr 26, 2018  1:30 PM CDT   DX HIP/PELVIS/SPINE with HIDX1   [facility] ([facility] )    [address] [facility] [id number] [phone]           Please do not take any of the following 24 hours prior to the day of your exam: vitamins, calcium tablets, antacids.  If possible, please wear clothes without metal (snaps, zippers). A sweatsuit works well.                 Review of your medicines      Our records show that you are taking the medicines listed below. If these are incorrect, please call your family doctor or clinic.        Dose / Directions Last dose taken    ASPIRIN EC PO   Dose:  81 mg        Take 81 mg by mouth daily   Refills:  0        calcium-vitamin D 600-400 MG-UNIT per tablet   Commonly known as:  CALTRATE   Dose:  1 tablet        Take 1 tablet by mouth 2 times daily   Refills:  0        carbidopa-levodopa  MG per tablet   Commonly known as:  SINEMET   Dose:  1 tablet        Take 1 tablet by mouth daily   Refills:  0        CARDIZEM  MG 24 hr capsule   Dose:  240 mg   Generic drug:  diltiazem        Take 240 mg by mouth daily   Refills:  0        cetirizine 10 MG tablet   Commonly known as:  zyrTEC   Dose:  10 mg        Take 10 mg by mouth daily as needed for allergies   Refills:  0        fluticasone 220 MCG/ACT Inhaler   Commonly known as:  FLOVENT HFA   Dose:  2 puff        Inhale 2 puffs into the lungs 2 times daily   Refills:  0        fluticasone 50 MCG/ACT spray   Commonly known as:  FLONASE   Dose:  1 spray        Spray 1 spray into both nostrils daily   Refills:  0        IBUPROFEN PO   Dose:  400 mg        Take 400 mg by mouth every 6 hours as needed for moderate pain   Refills:  0        METOPROLOL SUCCINATE ER PO   Dose:  100 mg        Take 100 mg by mouth daily   Refills:  0        montelukast 10 MG tablet   Commonly known as:   "SINGULAIR   Dose:  10 mg   Quantity:  30 tablet        Take 1 tablet (10 mg) by mouth At Bedtime   Refills:  11        MULTI VITAMIN DAILY PO   Dose:  1 tablet        Take 1 tablet by mouth daily   Refills:  0        SIMVASTATIN PO   Dose:  10 mg        Take 10 mg by mouth At Bedtime   Refills:  0        SYNTHROID PO   Dose:  175 mcg        Take 175 mcg by mouth daily   Refills:  0        triamcinolone 0.1 % cream   Commonly known as:  KENALOG        Apply topically 2 times daily as needed   Refills:  0        TYLENOL PO   Dose:  325 mg        Take 325 mg by mouth every 8 hours as needed for mild pain or fever   Refills:  0                Procedures and tests performed during your visit     XR Finger Right G/E 2 Views      Orders Needing Specimen Collection     None      Pending Results     No orders found from 2018 to 2018.            Pending Culture Results     No orders found from 2018 to 2018.            Thank you for choosing [facility]       Thank you for choosing [facility] for your care. Our goal is always to provide you with excellent care. Hearing back from our patients is one way we can continue to improve our services. Please take a few minutes to complete the written survey that you may receive in the mail after you visit with us. Thank you!        [employer] Information     [facility] lets you send messages to your doctor, view your test results, renew your prescriptions, schedule appointments and more. To sign up, go to [email or web address] . Click on \"Log in\" on the left side of the screen, which will take you to the Welcome page. Then click on \"Sign up Now\" on the right side of the page.     You will be asked to enter the access code listed below, as well as some personal information. Please follow the directions to create your username and password.     Your access code is: [email or web address]  Expires: 2018  2:37 PM     Your access code will  in 90 days. If you need help or a new " code, please call your [facility] clinic or [phone].        Care EveryWhere ID     This is your Care EveryWhere ID. This could be used by other organizations to access your [facility] medical records  [id number]        Equal Access to Services     LESTER JONES : Gayle Beckwith, wahinada luqadaha, qaybta kaalmada stephane, juliet disla. So [facility] [phone].    ATENCIÓN: Si habla español, tiene a johns disposición servicios gratuitos de asistencia lingüística. Llame al [phone].    We comply with applicable federal civil rights laws and [place] laws. We do not discriminate on the basis of race, color, national origin, age, disability, sex, sexual orientation, or gender identity.            After Visit Summary       This is your record. Keep this with you and show to your community pharmacist(s) and doctor(s) at your next visit.

## 2019-09-19 ENCOUNTER — TRANSFERRED RECORDS (OUTPATIENT)
Dept: HEALTH INFORMATION MANAGEMENT | Facility: CLINIC | Age: 69
End: 2019-09-19

## 2019-10-10 ENCOUNTER — TRANSFERRED RECORDS (OUTPATIENT)
Dept: HEALTH INFORMATION MANAGEMENT | Facility: CLINIC | Age: 69
End: 2019-10-10

## 2019-10-31 NOTE — PROGRESS NOTES
Otolaryngology Consultation    Patient: Tamra Dc  : 1950    Patient presents with:  RECHECK: Follow Up Preauricular Cyst  referral:  Dr. Felix, BILL    HPI:  Tamra Dc is a 69 year old female seen today for right facial cyst  She was last seen on 2/3/2017 for removal of 1 cm right preauricular cyst    She was referred back to us by Dr. Felix who noted a 2 cm mobile subcutaneous mass of the right ankle of the mandible      CAT scan in 2017 revealed this was a 1 cm in a subcutaneous lesion along the superficial margin of the right parotid felt to be either a sebaceous cyst or a lymph node but also could be a small nodule within the right parotid    FNA of the right facial cyst was obtained at her 2017 visit which was negative for malignancy    History of Parkinsons     There has been no facial weakness    Current Outpatient Rx   Medication Sig Dispense Refill     Acetaminophen (TYLENOL PO) Take 325 mg by mouth every 8 hours as needed for mild pain or fever       ASPIRIN EC PO Take 81 mg by mouth daily       calcium-vitamin D (CALTRATE) 600-400 MG-UNIT per tablet Take 1 tablet by mouth 2 times daily       carbidopa-levodopa (SINEMET)  MG per tablet Take 1 tablet by mouth daily       cetirizine (ZYRTEC) 10 MG tablet Take 10 mg by mouth daily as needed for allergies       diltiazem (CARDIZEM CD) 240 MG 24 hr capsule Take 240 mg by mouth daily       fluticasone (FLONASE) 50 MCG/ACT spray Spray 1 spray into both nostrils daily       fluticasone (FLOVENT HFA) 220 MCG/ACT Inhaler Inhale 2 puffs into the lungs 2 times daily       IBUPROFEN PO Take 400 mg by mouth every 6 hours as needed for moderate pain       Levothyroxine Sodium (SYNTHROID PO) Take 175 mcg by mouth daily        METOPROLOL SUCCINATE ER PO Take 100 mg by mouth daily        montelukast (SINGULAIR) 10 MG tablet Take 1 tablet (10 mg) by mouth At Bedtime 30 tablet 11     Multiple Vitamin (MULTI VITAMIN DAILY PO) Take 1 tablet by mouth  "daily       SIMVASTATIN PO Take 10 mg by mouth At Bedtime       triamcinolone (KENALOG) 0.1 % cream Apply topically 2 times daily as needed          Allergies: Food and No clinical screening - see comments     History reviewed. No pertinent past medical history.    Past Surgical History:   Procedure Laterality Date     COLONOSCOPY N/A 4/18/2018    Procedure: COLONOSCOPY;  FLEXIBLE  SIGMOIDOSCOPY WITH POLYPECTOMY;  Surgeon: Rashad Nickerson DO;  Location: [facility]       ENT family history reviewed    Social History     Tobacco Use     Smoking status: Never Smoker     Smokeless tobacco: Never Used   Substance Use Topics     Alcohol use: None     Drug use: None       Review of Systems  ROS: 10 point ROS neg other than the symptoms noted above in the HPI and sneezing itchy eyes palpitations neck and joint pain tinnitus and hearing loss    Physical Exam  /60   Pulse 80   Temp 98.1  F (36.7  C) (Tympanic)   Ht 1.676 m (5' 6\")   Wt 77.1 kg (170 lb)   SpO2 94%   BMI 27.44 kg/m    General - The patient is well nourished and well developed, and appears to have good nutritional status.  Alert and oriented to person and place, answers questions and cooperates with examination appropriately.   Head and Face - Normocephalic and atraumatic, with no gross asymmetry noted.  The facial nerve is intact, with strong symmetric movements.  House brackman grade 1/6 bilaterally  parkinsononian slow speech and facies    Neck-right angle of the mandible with a subcutaneous mobile 2.5 cm mass   No preauricular swelling  No palpable cervical lymphadenopathy    Impression and Plan- Tamra Dc is a 69 year old female with:    ICD-10-CM    1. facial cyst D23.30 CT Soft Tissue Neck w Contrast   2. Hearing loss, unspecified hearing loss type, unspecified laterality H91.90    3. Parkinson's disease (H) G20        I will reorder the CAT scan this area has enlarged and may actually be in a slightly different location than the first " time I saw her only because it is so close to the marginal mandibular branch of the facial nerve.  For this reason I will also perform the excision and surgery with nerve monitoring.  I told Tamra there is a risk of permanent facial weakness or asymmetry.  She may have difficulty with oral intake and need speech therapy postoperatively.  She understands and wishes to proceed.    The potential risks and complications of excision right mandibular cyst with possible frozens was discussed, including but not limited to general endotracheal anesthesia, infection, bleeding, injury to adjacent nerves with possible permanent weakness to the face, tonja-incisional and neck numbness, change in speech ,keloid formation, need for additional surgery.        Audiogram will be scheduled due to c/o hearing loss . Ensure current amplification appropriate  See BEL Puente for ear exam at time of her post operative visit    Viktoria Johnson D.O.  Otolaryngology/Head and Neck Surgery  Allergy

## 2019-11-04 ENCOUNTER — OFFICE VISIT (OUTPATIENT)
Dept: OTOLARYNGOLOGY | Facility: OTHER | Age: 69
End: 2019-11-04
Attending: OTOLARYNGOLOGY
Payer: MEDICARE

## 2019-11-04 VITALS
DIASTOLIC BLOOD PRESSURE: 60 MMHG | TEMPERATURE: 98.1 F | WEIGHT: 170 LBS | BODY MASS INDEX: 27.32 KG/M2 | SYSTOLIC BLOOD PRESSURE: 128 MMHG | OXYGEN SATURATION: 94 % | HEIGHT: 66 IN | HEART RATE: 80 BPM

## 2019-11-04 DIAGNOSIS — G20.A1 PARKINSON'S DISEASE (H): ICD-10-CM

## 2019-11-04 DIAGNOSIS — H91.90 HEARING LOSS, UNSPECIFIED HEARING LOSS TYPE, UNSPECIFIED LATERALITY: ICD-10-CM

## 2019-11-04 DIAGNOSIS — D23.30 CYST, DERMOID, FACE: Primary | ICD-10-CM

## 2019-11-04 PROCEDURE — G0463 HOSPITAL OUTPT CLINIC VISIT: HCPCS

## 2019-11-04 PROCEDURE — 99214 OFFICE O/P EST MOD 30 MIN: CPT | Performed by: OTOLARYNGOLOGY

## 2019-11-04 ASSESSMENT — MIFFLIN-ST. JEOR: SCORE: 1317.86

## 2019-11-04 ASSESSMENT — PAIN SCALES - GENERAL: PAINLEVEL: NO PAIN (0)

## 2019-11-04 NOTE — PATIENT INSTRUCTIONS
Thank you for allowing Dr. Johnson and our ENT team to participate in your care.  If your medications are too expensive, please give the nurse a call.  We can possibly change this medication.  If you have a scheduling or an appointment question please contact our Health Unit Coordinator at their direct line [phone].   ALL nursing questions or concerns can be directed to your ENT nurse at: [phone] - Erin    Complete Neck CT    Follow up in surgery with Dr. Johnson        HOW TO PREPARE-      You need to have a scheduled Pre-Op with your primary care physician within 30 days of your scheduled surgery.        You need a friend or family member available to drive you home AND stay with you for 24 hours after you leave the hospital. You will not be allowed to drive yourself. IF you need to take a taxi or the bus you MUST have a responsible person to ride with you. YOUR PROCEDURE WILL BE CANCELLED IF YOU DO NOT HAVE A RESPONSIBLE ADULT TO DRIVE YOU HOME.       You CANNOT have anything to eat or drink after midnight the night before your surgery, including water and coffee. Your stomach needs to be completely empty. Do NOT chew gum, suck on hard candy, or smoke. You can brush your teeth the morning of surgery.       The Surgery Education Nurses will call you  1-2 weeks prior to your surgery date at  [phone] or toll free [phone]. Please have your medication and allergy lists ready.      Stop your aspirin or other NSAIDs(Ibuprofen, Motrin, Aleve, Celebrex, Naproxen, etc...) 7 days before your surgery.      Hospital admitting will call you the day before your surgery with your exact arrival time.       Please call your primary care physician if you should become ill within 24 hours of scheduled surgery. (ex.vomiting, diarrhea, fever)          You will need to wash the night before AND the morning of you procedure with a liquid antibacterial soap, like Dial.

## 2019-11-04 NOTE — LETTER
2019         RE: Tamra Dc  [address] [place] [address]        Dear Colleague,    Thank you for referring your patient, Tamra Dc, to the [facility]. Please see a copy of my visit note below.    Otolaryngology Consultation    Patient: Tamra Dc  : 1950    Patient presents with:  RECHECK: Follow Up Preauricular Cyst  referral:  Dr. Felix, [place]    HPI:  Tamra Dc is a 69 year old female seen today for right facial cyst  She was last seen on 2/3/2017 for removal of 1 cm right preauricular cyst    She was referred back to us by Dr. Felix who noted a 2 cm mobile subcutaneous mass of the right ankle of the mandible      CAT scan in 2017 revealed this was a 1 cm in a subcutaneous lesion along the superficial margin of the right parotid felt to be either a sebaceous cyst or a lymph node but also could be a small nodule within the right parotid    FNA of the right facial cyst was obtained at her 2017 visit which was negative for malignancy    History of Parkinsons     There has been no facial weakness    Current Outpatient Rx   Medication Sig Dispense Refill     Acetaminophen (TYLENOL PO) Take 325 mg by mouth every 8 hours as needed for mild pain or fever       ASPIRIN EC PO Take 81 mg by mouth daily       calcium-vitamin D (CALTRATE) 600-400 MG-UNIT per tablet Take 1 tablet by mouth 2 times daily       carbidopa-levodopa (SINEMET)  MG per tablet Take 1 tablet by mouth daily       cetirizine (ZYRTEC) 10 MG tablet Take 10 mg by mouth daily as needed for allergies       diltiazem (CARDIZEM CD) 240 MG 24 hr capsule Take 240 mg by mouth daily       fluticasone (FLONASE) 50 MCG/ACT spray Spray 1 spray into both nostrils daily       fluticasone (FLOVENT HFA) 220 MCG/ACT Inhaler Inhale 2 puffs into the lungs 2 times daily       IBUPROFEN PO Take 400 mg by mouth every 6 hours as needed for moderate pain       Levothyroxine Sodium (SYNTHROID PO) Take 175 mcg  "by mouth daily        METOPROLOL SUCCINATE ER PO Take 100 mg by mouth daily        montelukast (SINGULAIR) 10 MG tablet Take 1 tablet (10 mg) by mouth At Bedtime 30 tablet 11     Multiple Vitamin (MULTI VITAMIN DAILY PO) Take 1 tablet by mouth daily       SIMVASTATIN PO Take 10 mg by mouth At Bedtime       triamcinolone (KENALOG) 0.1 % cream Apply topically 2 times daily as needed          Allergies: Food and No clinical screening - see comments     History reviewed. No pertinent past medical history.    Past Surgical History:   Procedure Laterality Date     COLONOSCOPY N/A 4/18/2018    Procedure: COLONOSCOPY;  FLEXIBLE  SIGMOIDOSCOPY WITH POLYPECTOMY;  Surgeon: Rashad Nickerson DO;  Location: [facility]       ENT family history reviewed    Social History     Tobacco Use     Smoking status: Never Smoker     Smokeless tobacco: Never Used   Substance Use Topics     Alcohol use: None     Drug use: None       Review of Systems  ROS: 10 point ROS neg other than the symptoms noted above in the HPI    Physical Exam  /60   Pulse 80   Temp 98.1  F (36.7  C) (Tympanic)   Ht 1.676 m (5' 6\")   Wt 77.1 kg (170 lb)   SpO2 94%   BMI 27.44 kg/m     General - The patient is well nourished and well developed, and appears to have good nutritional status.  Alert and oriented to person and place, answers questions and cooperates with examination appropriately.   Head and Face - Normocephalic and atraumatic, with no gross asymmetry noted.  The facial nerve is intact, with strong symmetric movements.  House brackman grade 1/6 bilaterally  parkinsononian slow speech and facies    Neck-right angle of the mandible with a subcutaneous mobile 2.5 cm mass   No preauricular swelling  No palpable cervical lymphadenopathy    Impression and Plan- Tamra Dc is a 69 year old female with:    ICD-10-CM    1. facial cyst D23.30 CT Soft Tissue Neck w Contrast     I will reorder the CAT scan this area has enlarged and may actually be in a " slightly different location than the first time I saw her only because it is so close to the marginal mandibular branch of the facial nerve.  For this reason I will also perform the excision and surgery with nerve monitoring.  I told Tamra there is a risk of permanent facial weakness or asymmetry.  She may have difficulty with oral intake and need speech therapy postoperatively.  She understands and wishes to proceed.    The potential risks and complications of excision right mandibular cyst with possible frozens was discussed, including but not limited to general endotracheal anesthesia, infection, bleeding, injury to adjacent nerves with possible permanent weakness to the face, tonja-incisional and neck numbness, change in speech ,keloid formation, need for additional surgery.              Viktoria Johnson D.O.  Otolaryngology/Head and Neck Surgery  Allergy            Again, thank you for allowing me to participate in the care of your patient.        Sincerely,        Viktoria Johnson MD

## 2019-11-06 ENCOUNTER — HOSPITAL ENCOUNTER (OUTPATIENT)
Dept: CT IMAGING | Facility: HOSPITAL | Age: 69
Discharge: HOME | End: 2019-11-06
Attending: OTOLARYNGOLOGY | Admitting: OTOLARYNGOLOGY
Payer: MEDICARE

## 2019-11-06 DIAGNOSIS — D23.30 CYST, DERMOID, FACE: ICD-10-CM

## 2019-11-06 PROCEDURE — 70491 CT SOFT TISSUE NECK W/DYE: CPT | Mod: TC

## 2019-11-06 PROCEDURE — 25500064 ZZH RX 255 OP 636: Performed by: RADIOLOGY

## 2019-11-06 RX ORDER — IOPAMIDOL 612 MG/ML
100 INJECTION, SOLUTION INTRAVASCULAR ONCE
Status: COMPLETED | OUTPATIENT
Start: 2019-11-06 | End: 2019-11-06

## 2019-11-06 RX ADMIN — IOPAMIDOL 100 ML: 612 INJECTION, SOLUTION INTRAVENOUS at 11:58

## 2019-11-08 ENCOUNTER — PREP FOR PROCEDURE (OUTPATIENT)
Dept: OTOLARYNGOLOGY | Facility: OTHER | Age: 69
End: 2019-11-08

## 2019-11-08 DIAGNOSIS — K09.1 MEDIAN MANDIBULAR CYST: Primary | ICD-10-CM

## 2019-11-11 PROBLEM — K09.1: Status: ACTIVE | Noted: 2019-11-11

## 2019-11-12 ENCOUNTER — OFFICE VISIT (OUTPATIENT)
Dept: AUDIOLOGY | Facility: OTHER | Age: 69
End: 2019-11-12
Attending: OTOLARYNGOLOGY
Payer: MEDICARE

## 2019-11-12 DIAGNOSIS — H90.3 SENSORINEURAL HEARING LOSS, ASYMMETRICAL: Primary | ICD-10-CM

## 2019-11-12 PROCEDURE — 92567 TYMPANOMETRY: CPT | Performed by: AUDIOLOGIST

## 2019-11-12 PROCEDURE — 92557 COMPREHENSIVE HEARING TEST: CPT | Performed by: AUDIOLOGIST

## 2019-11-12 NOTE — PROGRESS NOTES
Audiology Evaluation Completed. Please refer SCANNED AUDIOGRAM and/or TYMPANOGRAM for BACKGROUND, RESULTS, RECOMMENDATIONS.      Mansi RAZO, [facility]  Audiologist #[id number]

## 2019-11-13 RX ORDER — DILTIAZEM HYDROCHLORIDE 180 MG/1
180 CAPSULE, EXTENDED RELEASE ORAL DAILY
COMMUNITY

## 2019-11-15 ENCOUNTER — ANESTHESIA EVENT (OUTPATIENT)
Dept: SURGERY | Facility: HOSPITAL | Age: 69
End: 2019-11-15
Payer: MEDICARE

## 2019-11-15 ASSESSMENT — ENCOUNTER SYMPTOMS: DYSRHYTHMIAS: 1

## 2019-11-15 NOTE — ANESTHESIA PREPROCEDURE EVALUATION
Anesthesia Pre-Procedure Evaluation    Patient: Tamra Dc   MRN: [id number] : 1950          Preoperative Diagnosis: Median mandibular cyst [K09.1]    Procedure(s):  EXCISION RIGHT MANDIBULAR CYST WITH NERVE MONITORING AND POSSIBLE FROZEN SECTIONS    History reviewed. No pertinent past medical history.  Past Surgical History:   Procedure Laterality Date     COLONOSCOPY N/A 2018    Procedure: COLONOSCOPY;  FLEXIBLE  SIGMOIDOSCOPY WITH POLYPECTOMY;  Surgeon: Rashad Nickerson DO;  Location: [facility]       Anesthesia Evaluation     . Pt has had prior anesthetic.     No history of anesthetic complications          ROS/MED HX    ENT/Pulmonary:     (+)allergic rhinitis, , . .    Neurologic:     (+)Parkinson's disease (took sinemet today)     Cardiovascular:     (+) Dyslipidemia, hypertension-range: rx metoprolol, took today, ---. : . . . :. dysrhythmias (supraventricular tachycardia ) Other, . Previous cardiac testing date:results:date: results:ECG reviewed date:4/10/2018 results:NSR@61, ?inferior infarct age undetermined date: results:          METS/Exercise Tolerance:  >4 METS   Hematologic:     (+) Anemia, -      Musculoskeletal:   (+) arthritis,  other musculoskeletal- DJD      GI/Hepatic:     (+) Other GI/Hepatic OCCULT BLOOD POSITIVE STOOL, diverticulosis      Renal/Genitourinary:  - ROS Renal section negative       Endo:     (+) thyroid problem hypothyroidism, .      Psychiatric:  - neg psychiatric ROS       Infectious Disease:  - neg infectious disease ROS       Malignancy:   (+) Malignancy History of Breast  Breast CA Remission status post Radiation.         Other:    - neg other ROS                      Physical Exam  Normal systems: cardiovascular, pulmonary and dental    Airway   Mallampati: II  TM distance: >3 FB  Neck ROM: full    Dental     Cardiovascular   Rhythm and rate: regular and normal      Pulmonary    breath sounds clear to auscultation            Lab Results   Component Value Date  "   WBC 19.9 (H) 03/28/2016    HGB 13.3 03/28/2016    HCT 39.2 03/28/2016     03/28/2016    CRP 31.6 (H) 03/28/2016     03/28/2016    POTASSIUM 3.8 03/28/2016    CHLORIDE 107 03/28/2016    CO2 23 03/28/2016    BUN 17 03/28/2016    CR 0.74 03/28/2016     (H) 03/28/2016    JEREMIAS 8.8 03/28/2016    ALBUMIN 3.2 (L) 03/28/2016    PROTTOTAL 6.7 (L) 03/28/2016    ALT 29 03/28/2016    AST 20 03/28/2016    ALKPHOS 80 03/28/2016    BILITOTAL 0.4 03/28/2016       Preop Vitals  BP Readings from Last 3 Encounters:   11/04/19 128/60   04/26/18 176/61   04/18/18 129/54    Pulse Readings from Last 3 Encounters:   11/04/19 80   04/26/18 72   04/18/18 62      Resp Readings from Last 3 Encounters:   04/26/18 16   04/18/18 18   02/03/17 15    SpO2 Readings from Last 3 Encounters:   11/04/19 94%   04/26/18 96%   04/18/18 95%      Temp Readings from Last 1 Encounters:   11/04/19 98.1  F (36.7  C) (Tympanic)    Ht Readings from Last 1 Encounters:   11/04/19 1.676 m (5' 6\")      Wt Readings from Last 1 Encounters:   11/04/19 77.1 kg (170 lb)    Estimated body mass index is 27.44 kg/m  as calculated from the following:    Height as of 11/4/19: 1.676 m (5' 6\").    Weight as of 11/4/19: 77.1 kg (170 lb).       Anesthesia Plan      History & Physical Review  History and physical reviewed and following examination; no interval change.    ASA Status:  3 .    NPO Status:  > 8 hours    Plan for General and LMA with Intravenous induction. Maintenance will be Balanced.    PONV prophylaxis:  Ondansetron (or other 5HT-3) and Dexamethasone or Solumedrol  Discussed risks and benefits with patient for general anesthesia including sore throat, nausea, vomiting, aspiration, dental damage, loss of airway, CV complications, stroke, MI, death. Pt wishes to proceed.   )      Postoperative Care  Postoperative pain management:  IV analgesics.      Consents  Anesthetic plan, risks, benefits and alternatives discussed with:  Patient..           "       Otoniel Monique, APRN CRNA

## 2019-11-18 NOTE — OR NURSING
Client phoned regarding question if she could take Melatonin to help her sleep.  Yes.  She repeated back all of her instructions correctly to me.  Advised to call if has any further questions.  Lilly Grant RN

## 2019-11-20 ENCOUNTER — ANESTHESIA (OUTPATIENT)
Dept: SURGERY | Facility: HOSPITAL | Age: 69
End: 2019-11-20
Payer: MEDICARE

## 2019-11-20 ENCOUNTER — HOSPITAL ENCOUNTER (OUTPATIENT)
Facility: HOSPITAL | Age: 69
Discharge: HOME | End: 2019-11-20
Attending: OTOLARYNGOLOGY | Admitting: OTOLARYNGOLOGY
Payer: MEDICARE

## 2019-11-20 VITALS
DIASTOLIC BLOOD PRESSURE: 54 MMHG | OXYGEN SATURATION: 93 % | RESPIRATION RATE: 20 BRPM | WEIGHT: 170 LBS | TEMPERATURE: 97.5 F | HEART RATE: 73 BPM | HEIGHT: 66 IN | BODY MASS INDEX: 27.32 KG/M2 | SYSTOLIC BLOOD PRESSURE: 135 MMHG

## 2019-11-20 DIAGNOSIS — K09.1 MEDIAN MANDIBULAR CYST: ICD-10-CM

## 2019-11-20 DIAGNOSIS — Z98.890 POST-OPERATIVE STATE: Primary | ICD-10-CM

## 2019-11-20 PROCEDURE — 11422 EXC H-F-NK-SP B9+MARG 1.1-2: CPT | Performed by: NURSE ANESTHETIST, CERTIFIED REGISTERED

## 2019-11-20 PROCEDURE — 88304 TISSUE EXAM BY PATHOLOGIST: CPT | Mod: TC | Performed by: OTOLARYNGOLOGY

## 2019-11-20 PROCEDURE — 21012 EXC FACE LES SBQ 2 CM/>: CPT | Performed by: OTOLARYNGOLOGY

## 2019-11-20 PROCEDURE — 25800030 ZZH RX IP 258 OP 636: Performed by: NURSE ANESTHETIST, CERTIFIED REGISTERED

## 2019-11-20 PROCEDURE — 71000027 ZZH RECOVERY PHASE 2 EACH 15 MINS: Performed by: OTOLARYNGOLOGY

## 2019-11-20 PROCEDURE — 71000014 ZZH RECOVERY PHASE 1 LEVEL 2 FIRST HR: Performed by: OTOLARYNGOLOGY

## 2019-11-20 PROCEDURE — 27110028 ZZH OR GENERAL SUPPLY NON-STERILE: Performed by: OTOLARYNGOLOGY

## 2019-11-20 PROCEDURE — 36000058 ZZH SURGERY LEVEL 3 EA 15 ADDTL MIN: Performed by: OTOLARYNGOLOGY

## 2019-11-20 PROCEDURE — 37000009 ZZH ANESTHESIA TECHNICAL FEE, EACH ADDTL 15 MIN: Performed by: OTOLARYNGOLOGY

## 2019-11-20 PROCEDURE — 40000305 ZZH STATISTIC PRE PROC ASSESS I: Performed by: OTOLARYNGOLOGY

## 2019-11-20 PROCEDURE — 25000566 ZZH SEVOFLURANE, EA 15 MIN: Performed by: NURSE ANESTHETIST, CERTIFIED REGISTERED

## 2019-11-20 PROCEDURE — 25000125 ZZHC RX 250: Performed by: NURSE ANESTHETIST, CERTIFIED REGISTERED

## 2019-11-20 PROCEDURE — 36000056 ZZH SURGERY LEVEL 3 1ST 30 MIN: Performed by: OTOLARYNGOLOGY

## 2019-11-20 PROCEDURE — 25000125 ZZHC RX 250: Performed by: OTOLARYNGOLOGY

## 2019-11-20 PROCEDURE — 27210794 ZZH OR GENERAL SUPPLY STERILE: Performed by: OTOLARYNGOLOGY

## 2019-11-20 PROCEDURE — 25000128 H RX IP 250 OP 636: Performed by: NURSE ANESTHETIST, CERTIFIED REGISTERED

## 2019-11-20 PROCEDURE — 37000008 ZZH ANESTHESIA TECHNICAL FEE, 1ST 30 MIN: Performed by: OTOLARYNGOLOGY

## 2019-11-20 RX ORDER — SODIUM CHLORIDE, SODIUM LACTATE, POTASSIUM CHLORIDE, CALCIUM CHLORIDE 600; 310; 30; 20 MG/100ML; MG/100ML; MG/100ML; MG/100ML
INJECTION, SOLUTION INTRAVENOUS CONTINUOUS
Status: DISCONTINUED | OUTPATIENT
Start: 2019-11-20 | End: 2019-11-20 | Stop reason: HOSPADM

## 2019-11-20 RX ORDER — MEPERIDINE HYDROCHLORIDE 50 MG/ML
12.5 INJECTION INTRAMUSCULAR; INTRAVENOUS; SUBCUTANEOUS
Status: DISCONTINUED | OUTPATIENT
Start: 2019-11-20 | End: 2019-11-20 | Stop reason: HOSPADM

## 2019-11-20 RX ORDER — FENTANYL CITRATE 50 UG/ML
INJECTION, SOLUTION INTRAMUSCULAR; INTRAVENOUS PRN
Status: DISCONTINUED | OUTPATIENT
Start: 2019-11-20 | End: 2019-11-20

## 2019-11-20 RX ORDER — ONDANSETRON 2 MG/ML
4 INJECTION INTRAMUSCULAR; INTRAVENOUS EVERY 30 MIN PRN
Status: DISCONTINUED | OUTPATIENT
Start: 2019-11-20 | End: 2019-11-20 | Stop reason: HOSPADM

## 2019-11-20 RX ORDER — ONDANSETRON 2 MG/ML
INJECTION INTRAMUSCULAR; INTRAVENOUS PRN
Status: DISCONTINUED | OUTPATIENT
Start: 2019-11-20 | End: 2019-11-20

## 2019-11-20 RX ORDER — DEXAMETHASONE SODIUM PHOSPHATE 10 MG/ML
INJECTION, SOLUTION INTRAMUSCULAR; INTRAVENOUS PRN
Status: DISCONTINUED | OUTPATIENT
Start: 2019-11-20 | End: 2019-11-20

## 2019-11-20 RX ORDER — BACITRACIN ZINC 500 [USP'U]/G
OINTMENT TOPICAL PRN
Status: DISCONTINUED | OUTPATIENT
Start: 2019-11-20 | End: 2019-11-20 | Stop reason: HOSPADM

## 2019-11-20 RX ORDER — HYDROCODONE BITARTRATE AND ACETAMINOPHEN 7.5; 325 MG/1; MG/1
1 TABLET ORAL EVERY 6 HOURS PRN
Qty: 5 TABLET | Refills: 0 | Status: SHIPPED | OUTPATIENT
Start: 2019-11-20 | End: 2019-11-27

## 2019-11-20 RX ORDER — NALOXONE HYDROCHLORIDE 0.4 MG/ML
.1-.4 INJECTION, SOLUTION INTRAMUSCULAR; INTRAVENOUS; SUBCUTANEOUS
Status: DISCONTINUED | OUTPATIENT
Start: 2019-11-20 | End: 2019-11-20 | Stop reason: HOSPADM

## 2019-11-20 RX ORDER — FENTANYL CITRATE 50 UG/ML
25-50 INJECTION, SOLUTION INTRAMUSCULAR; INTRAVENOUS
Status: DISCONTINUED | OUTPATIENT
Start: 2019-11-20 | End: 2019-11-20 | Stop reason: HOSPADM

## 2019-11-20 RX ORDER — LIDOCAINE HYDROCHLORIDE 20 MG/ML
INJECTION, SOLUTION INFILTRATION; PERINEURAL PRN
Status: DISCONTINUED | OUTPATIENT
Start: 2019-11-20 | End: 2019-11-20

## 2019-11-20 RX ORDER — ONDANSETRON 4 MG/1
4 TABLET, ORALLY DISINTEGRATING ORAL EVERY 30 MIN PRN
Status: DISCONTINUED | OUTPATIENT
Start: 2019-11-20 | End: 2019-11-20 | Stop reason: HOSPADM

## 2019-11-20 RX ORDER — HYDROMORPHONE HYDROCHLORIDE 1 MG/ML
.3-.5 INJECTION, SOLUTION INTRAMUSCULAR; INTRAVENOUS; SUBCUTANEOUS EVERY 10 MIN PRN
Status: DISCONTINUED | OUTPATIENT
Start: 2019-11-20 | End: 2019-11-20 | Stop reason: HOSPADM

## 2019-11-20 RX ORDER — ALBUTEROL SULFATE 0.83 MG/ML
2.5 SOLUTION RESPIRATORY (INHALATION) EVERY 4 HOURS PRN
Status: DISCONTINUED | OUTPATIENT
Start: 2019-11-20 | End: 2019-11-20 | Stop reason: HOSPADM

## 2019-11-20 RX ORDER — PROPOFOL 10 MG/ML
INJECTION, EMULSION INTRAVENOUS PRN
Status: DISCONTINUED | OUTPATIENT
Start: 2019-11-20 | End: 2019-11-20

## 2019-11-20 RX ORDER — LIDOCAINE HYDROCHLORIDE AND EPINEPHRINE 10; 10 MG/ML; UG/ML
INJECTION, SOLUTION INFILTRATION; PERINEURAL PRN
Status: DISCONTINUED | OUTPATIENT
Start: 2019-11-20 | End: 2019-11-20 | Stop reason: HOSPADM

## 2019-11-20 RX ORDER — HYDRALAZINE HYDROCHLORIDE 20 MG/ML
2.5-5 INJECTION INTRAMUSCULAR; INTRAVENOUS EVERY 10 MIN PRN
Status: DISCONTINUED | OUTPATIENT
Start: 2019-11-20 | End: 2019-11-20 | Stop reason: HOSPADM

## 2019-11-20 RX ADMIN — DEXAMETHASONE SODIUM PHOSPHATE 12 MG: 10 INJECTION, SOLUTION INTRAMUSCULAR; INTRAVENOUS at 10:50

## 2019-11-20 RX ADMIN — MIDAZOLAM 1 MG: 1 INJECTION INTRAMUSCULAR; INTRAVENOUS at 10:33

## 2019-11-20 RX ADMIN — FENTANYL CITRATE 25 MCG: 50 INJECTION, SOLUTION INTRAMUSCULAR; INTRAVENOUS at 10:48

## 2019-11-20 RX ADMIN — ONDANSETRON 4 MG: 2 INJECTION INTRAMUSCULAR; INTRAVENOUS at 10:50

## 2019-11-20 RX ADMIN — MIDAZOLAM 1 MG: 1 INJECTION INTRAMUSCULAR; INTRAVENOUS at 10:38

## 2019-11-20 RX ADMIN — FENTANYL CITRATE 25 MCG: 50 INJECTION, SOLUTION INTRAMUSCULAR; INTRAVENOUS at 10:51

## 2019-11-20 RX ADMIN — SODIUM CHLORIDE, POTASSIUM CHLORIDE, SODIUM LACTATE AND CALCIUM CHLORIDE 1000 ML: 600; 310; 30; 20 INJECTION, SOLUTION INTRAVENOUS at 09:44

## 2019-11-20 RX ADMIN — LIDOCAINE HYDROCHLORIDE 80 MG: 20 INJECTION, SOLUTION INFILTRATION; PERINEURAL at 10:44

## 2019-11-20 RX ADMIN — PROPOFOL 200 MG: 10 INJECTION, EMULSION INTRAVENOUS at 10:44

## 2019-11-20 ASSESSMENT — MIFFLIN-ST. JEOR: SCORE: 1317.86

## 2019-11-20 NOTE — OR NURSING
Sister Adilia at the bedside. The patient is taking juice and eating lisa crackers. The patient needs reminders to cough and deep breath. Loose non-productive noted with coughing.

## 2019-11-20 NOTE — DISCHARGE INSTRUCTIONS
Post-Anesthesia Patient Instructions    IMMEDIATELY FOLLOWING SURGERY:  Do not drive or operate machinery for the first twenty four hours after surgery.  Do not make any important decisions for twenty four hours after surgery or while taking narcotic pain medications or sedatives.  If you develop intractable nausea and vomiting or a severe headache please notify your doctor immediately.    FOLLOW-UP:  Please make an appointment with your surgeon as instructed. You do not need to follow up with anesthesia unless specifically instructed to do so.    WOUND CARE INSTRUCTIONS (if applicable):  Keep a dry clean dressing on the anesthesia/puncture wound site if there is drainage.  Once the wound has quit draining you may leave it open to air.  Generally you should leave the bandage intact for twenty four hours unless there is drainage.  If the epidural site drains for more than 36-48 hours please call the anesthesia department.    QUESTIONS?:  Please feel free to call your physician or the hospital  if you have any questions, and they will be happy to assist you.   WOUND CARE INSTRUCTIONS:    Surgical findings:  Benign Sebaceous Cyst, final pathology pending    Follow up as needed, your sutures are dissolvable, you will not need to have them removed  Cleanse wound if not covered with diluted peroxide or mild skin cleanser 3 times daily and.   Apply bacitracin to the wound for 1 week, then apply aquaphor ointment.   This can be purchased over the counter.  If your wound is covered, leave dressing in place for 48 hours.  No soaking of the wound.  For any heavy bleeding or excessive swelling  please contact our office at [phone] or present to the emergency room.    Take ibuprofen alternated with tylenol as needed for pain  If you need additional pain medication, you were sent home with lortab    For any heavy bleeding or excessive swelling  please contact our office at [phone] or present to the emergency  room.

## 2019-11-20 NOTE — OP NOTE
Otolaryngology Operative Note     Pre-op Diagnosis: Right facial cyst  Post-op Diagnosis:  same  Procedure:  1.  Excision right facial cyst with 15 minutes nerve monitoring of the marginal mandibular nerve   Surgeon:  Viktoria Johnson D.O.  Anesthesia:  General endotracheal  EBL:  1 ml  Findings: sebaceous cyst   Complications:  none  Condition:  stable     Description of the Procedure  After surgical consent was obtained the patient was brought back to the upper room and laid in a comfortable and supine position.  She was administered a general anesthetic by member of anesthesia.  The skin was cleansed with alcohol and the electrodes were placed to monitor the marginal mandibular nerve.  She was prepped and draped in normal sterile fashion with the corner of the mouth visualized throughout the procedure.  A skin marker was used to delineate a linear incision parallel to relaxed skin tension lines and the skin was anesthetized with lidocaine with 1-200,000 of epinephrine.  Epi saline was also used to anesthetize the skin.  Surgical loops were worn throughout the procedure .   I used a 15 blade to incise the skin just through the subcutaneous tissue.  The skin rakes were placed.  I dissected around the cyst which was clearly a sebaceous cyst using blunt and sharp dissection.  The cyst did rupture but was able to clasped with a hemostat and then completely dissected and removed.  The wound was irrigated the underlying subcutaneous tissue is normal and the fascia is intact and normal in appearance.  I used the press probe to ensure there are no superficial branches of the marginal marginal mandibular nerve present and then I achieved hemostasis using scant use of bipolar cautery at a setting of 10.  The wound was copiously irrigated and gently suction.  The subcutaneous tissue was closed with interrupted 5-0 Vicryl.  The skin was closed with simple interrupted 5-0 fast gut.  The incision measured 1.6 cm  .bacitracin was applied and a bandage.  She was handed back over to anesthesia awakened and brought to recovery room in stable condition having tolerated the procedure well.

## 2019-11-20 NOTE — ANESTHESIA CARE TRANSFER NOTE
Patient: Tamra Dc    Procedure(s):  EXCISION RIGHT MANDIBULAR CYST WITH NERVE MONITORING AND POSSIBLE FROZEN SECTIONS    Diagnosis: Median mandibular cyst [K09.1]  Diagnosis Additional Information: No value filed.    Anesthesia Type:   General, LMA     Note:  Airway :Nasal Cannula  Patient transferred to:PACU  Handoff Report: Identifed the Patient, Identified the Reponsible Provider, Reviewed the pertinent medical history, Discussed the surgical course, Reviewed Intra-OP anesthesia mangement and issues during anesthesia, Set expectations for post-procedure period and Allowed opportunity for questions and acknowledgement of understanding      Vitals: (Last set prior to Anesthesia Care Transfer)    CRNA VITALS  11/20/2019 1111 - 11/20/2019 1202      11/20/2019             NIBP:  172/74    Pulse:  85    NIBP Mean:  106    Ht Rate:  83    SpO2:  100 %    Resp Rate (observed):  (!) 1                Electronically Signed By: LILIBETH David CRNA  November 20, 2019  12:02 PM

## 2019-11-20 NOTE — OR NURSING
Patient and responsible adult given discharge instructions with no questions regarding instructions. Taj score 20. Pain level 0/10.  Discharged from unit via wheelchair. Patient discharged to home, accompanied by her sister Adilia.

## 2019-11-20 NOTE — ANESTHESIA POSTPROCEDURE EVALUATION
Patient: Tamra Dc    Procedure(s):  EXCISION RIGHT MANDIBULAR CYST WITH NERVE MONITORING AND POSSIBLE FROZEN SECTIONS    Diagnosis:Median mandibular cyst [K09.1]  Diagnosis Additional Information: No value filed.    Anesthesia Type:  General, LMA    Note:  Anesthesia Post Evaluation    Patient location during evaluation: Phase 2  Patient participation: Able to fully participate in evaluation  Level of consciousness: awake and alert  Pain management: adequate  Airway patency: patent  Cardiovascular status: acceptable  Respiratory status: acceptable  Hydration status: stable  PONV: none     Anesthetic complications: None          Last vitals:  Vitals:    11/20/19 1245 11/20/19 1300 11/20/19 1315   BP: 131/52 135/52 135/54   Pulse: 73 69 73   Resp: 20 20 20   Temp:   97.5  F (36.4  C)   SpO2: 93% 93% 93%         Electronically Signed By: LILIBETH David CRNA  November 20, 2019  2:45 PM

## 2019-11-21 LAB — COPATH REPORT: NORMAL

## 2019-11-22 ENCOUNTER — DOCUMENTATION ONLY (OUTPATIENT)
Dept: OTHER | Facility: CLINIC | Age: 69
End: 2019-11-22

## 2019-11-27 ENCOUNTER — OFFICE VISIT (OUTPATIENT)
Dept: OTOLARYNGOLOGY | Facility: OTHER | Age: 69
End: 2019-11-27
Attending: PHYSICIAN ASSISTANT
Payer: MEDICARE

## 2019-11-27 VITALS
SYSTOLIC BLOOD PRESSURE: 126 MMHG | OXYGEN SATURATION: 95 % | TEMPERATURE: 97.9 F | DIASTOLIC BLOOD PRESSURE: 50 MMHG | HEART RATE: 80 BPM | BODY MASS INDEX: 27.32 KG/M2 | WEIGHT: 170 LBS | HEIGHT: 66 IN

## 2019-11-27 DIAGNOSIS — Z98.890 HX OF REMOVAL OF CYST: ICD-10-CM

## 2019-11-27 DIAGNOSIS — Z87.2 H/O SEBACEOUS CYST: Primary | ICD-10-CM

## 2019-11-27 PROCEDURE — G0463 HOSPITAL OUTPT CLINIC VISIT: HCPCS

## 2019-11-27 PROCEDURE — 99024 POSTOP FOLLOW-UP VISIT: CPT | Performed by: PHYSICIAN ASSISTANT

## 2019-11-27 ASSESSMENT — PAIN SCALES - GENERAL: PAINLEVEL: NO PAIN (0)

## 2019-11-27 ASSESSMENT — MIFFLIN-ST. JEOR: SCORE: 1317.86

## 2019-11-27 NOTE — NURSING NOTE
"Chief Complaint   Patient presents with     Surgical Followup     Pt is s/p excision right facial cyst on 11/19/19.       Initial /50   Pulse 80   Temp 97.9  F (36.6  C) (Tympanic)   Ht 1.676 m (5' 6\")   Wt 77.1 kg (170 lb)   SpO2 95%   BMI 27.44 kg/m   Estimated body mass index is 27.44 kg/m  as calculated from the following:    Height as of this encounter: 1.676 m (5' 6\").    Weight as of this encounter: 77.1 kg (170 lb).  Medication Reconciliation: complete  Aubree Tirado LPN  "

## 2019-11-27 NOTE — PROGRESS NOTES
"Chief Complaint   Patient presents with     Surgical Followup     Pt is s/p excision right facial cyst on 11/19/19.     History of Present Illness - Tamra Dc is a 69 year old female who is here for their first post op visit, status post excision right facial cyst on 11/19/19.    Patient reports she is eating and drinking well. No fevers.   Pain was controlled, regular diet, no change in vision, no fevers or chills.    Operative Note- 11/20/19  Pre-op Diagnosis: Right facial cyst  Post-op Diagnosis:  same  Procedure:  1.  Excision right facial cyst with 15 minutes nerve monitoring of the marginal mandibular nerve   Surgeon:  Viktoria Johnson D.O.  Anesthesia:  General endotracheal  EBL:  1 ml  Findings: sebaceous cyst   Complications:  none  Condition:  stable      Pathology Report  FINAL DIAGNOSIS:   Skin, right face, excision   - Epidermal inclusion cyst     I have personally reviewed all specimens and/or slides, including the   listed special stains, and used them   with my medical judgement to determine or confirm the final diagnosis.     Electronically signed out by:     Erick Tello M.D.   Past medical history -   Patient Active Problem List   Diagnosis     Blood glucose abnormal     Allergic state     Epidermal inclusion cyst     Essential hypertension     Malignant neoplasm of upper-outer quadrant of female breast (H)     Osteopenia     Hyperlipidemia     Parkinson disease (H)     Primary osteoarthritis of both knees     Seborrheic keratosis     Sustained supraventricular tachycardia (H)     Hypothyroidism     Median mandibular cyst     BMI 26.0-26.9,adult     History of breast cancer      ROS: 10 point ROS neg other than the symptoms noted above in the HPI.    /50   Pulse 80   Temp 97.9  F (36.6  C) (Tympanic)   Ht 1.676 m (5' 6\")   Wt 77.1 kg (170 lb)   SpO2 95%   BMI 27.44 kg/m      General - The patient is well nourished and well developed, and appears to have good nutritional " status.  Alert and oriented to person and place, answers questions and cooperates with examination appropriately.   Head and Face - Normocephalic and atraumatic, with no gross asymmetry noted of the contour of the facial features.  The facial nerve is intact, with strong symmetric movements.  Eyes - Extraocular movements intact, and the pupils were reactive to light.  Sclera were not icteric or injected, conjunctiva were pink and moist.  Mouth - Examination of the oral cavity shows pink, healthy, moist mucosa.  No lesions or ulceration noted.  The dentition are in good repair.  The tongue is mobile and midline.  Nose- Normal exam.     Skin- Right mandible with well approximated healing incision.  3 dissolvable sutures are intact.  No erythema, edema or drainage noted.  Minimal post operative tenderness to palpation.  No fluctuation.  The area was cleansed with a mixture of 1/2 water and 1/2 peroxide and bacitracin applied.       ASSESSMENT:      ICD-10-CM    1. H/O sebaceous cyst Z87.2    2. Hx of removal of cyst Z98.890      Continue to clean the wound 2 times daily - you can use mild soap and water or 1/2 peroxide and 1/2 water to clean it  Continue to apply the bacitracin ointment 2-3 times per day for 3 more day then you can start aquaphor ointment 2-3 times per day    Watch for signs and symptoms of infection fever, chills, increased redness, malodor, induration, increased pain, increased drainage    You have 3 stitches on the outside that will dissolve and fall out on their own in the next week or so    No soaking of the wound x 3 weeks     You may shower normally keeping any dressing dry.     Take tylenol alternated with ibuprofen as needed for pain.  If necessary, you may have stronger pain medication that was sent home with you.         Silva Tinajero PA-C  ENT  [facility], [place]  [phone]

## 2019-11-27 NOTE — PATIENT INSTRUCTIONS
Continue to clean the wound 2 times daily - you can use mild soap and water or 1/2 peroxide and 1/2 water to clean it  Continue to apply the bacitracin ointment 2-3 times per day for 3 more day then you can start aquaphor ointment 2-3 times per day    Watch for signs and symptoms of infection fever, chills, increased redness, malodor, induration, increased pain, increased drainage  You have 3 stitches on the outside that will dissolve and fall out on their own in the next week or so    Thank you for allowing Silva Tinajero PA-C and  Robyn GARRIDO and our ENT team to participate in your care.  If your medications are too expensive, please give the nurse a call.  We can possibly change this medication.  If you have a scheduling or an appointment question please contact our Health Unit Coordinator at their direct line [phone].   ALL nursing questions or concerns can be directed to your ENT nurse at: [phone] or [phone]

## 2019-11-27 NOTE — LETTER
11/27/2019         RE: Tamra Dc  [address]  [place] [address]        Dear Colleague,    Thank you for referring your patient, Tamra Dc, to the [facility]. Please see a copy of my visit note below.    Chief Complaint   Patient presents with     Surgical Followup     Pt is s/p excision right facial cyst on 11/19/19.     History of Present Illness - Tamra Dc is a 69 year old female who is here for their first post op visit, status post excision right facial cyst on 11/19/19.    Patient reports she is eating and drinking well. No fevers.   Pain was controlled, regular diet, no change in vision, no fevers or chills.    Operative Note- 11/20/19  Pre-op Diagnosis: Right facial cyst  Post-op Diagnosis:  same  Procedure:  1.  Excision right facial cyst with 15 minutes nerve monitoring of the marginal mandibular nerve   Surgeon:  Viktoria Johnson D.O.  Anesthesia:  General endotracheal  EBL:  1 ml  Findings: sebaceous cyst   Complications:  none  Condition:  stable      Pathology Report  FINAL DIAGNOSIS:   Skin, right face, excision   - Epidermal inclusion cyst     I have personally reviewed all specimens and/or slides, including the   listed special stains, and used them   with my medical judgement to determine or confirm the final diagnosis.     Electronically signed out by:     Erick Tello M.D.   Past medical history -   Patient Active Problem List   Diagnosis     Blood glucose abnormal     Allergic state     Epidermal inclusion cyst     Essential hypertension     Malignant neoplasm of upper-outer quadrant of female breast (H)     Osteopenia     Hyperlipidemia     Parkinson disease (H)     Primary osteoarthritis of both knees     Seborrheic keratosis     Sustained supraventricular tachycardia (H)     Hypothyroidism     Median mandibular cyst     BMI 26.0-26.9,adult     History of breast cancer      ROS: 10 point ROS neg other than the symptoms noted above in  "the HPI.    /50   Pulse 80   Temp 97.9  F (36.6  C) (Tympanic)   Ht 1.676 m (5' 6\")   Wt 77.1 kg (170 lb)   SpO2 95%   BMI 27.44 kg/m       General - The patient is well nourished and well developed, and appears to have good nutritional status.  Alert and oriented to person and place, answers questions and cooperates with examination appropriately.   Head and Face - Normocephalic and atraumatic, with no gross asymmetry noted of the contour of the facial features.  The facial nerve is intact, with strong symmetric movements.  Eyes - Extraocular movements intact, and the pupils were reactive to light.  Sclera were not icteric or injected, conjunctiva were pink and moist.  Mouth - Examination of the oral cavity shows pink, healthy, moist mucosa.  No lesions or ulceration noted.  The dentition are in good repair.  The tongue is mobile and midline.  Nose- Normal exam.     Skin- Right mandible with well approximated healing incision.  3 dissolvable sutures are intact.  No erythema, edema or drainage noted.  Minimal post operative tenderness to palpation.  No fluctuation.  The area was cleansed with a mixture of 1/2 water and 1/2 peroxide and bacitracin applied.       ASSESSMENT:      ICD-10-CM    1. H/O sebaceous cyst Z87.2    2. Hx of removal of cyst Z98.890      Continue to clean the wound 2 times daily - you can use mild soap and water or 1/2 peroxide and 1/2 water to clean it  Continue to apply the bacitracin ointment 2-3 times per day for 3 more day then you can start aquaphor ointment 2-3 times per day    Watch for signs and symptoms of infection fever, chills, increased redness, malodor, induration, increased pain, increased drainage    You have 3 stitches on the outside that will dissolve and fall out on their own in the next week or so    No soaking of the wound x 3 weeks     You may shower normally keeping any dressing dry.     Take tylenol alternated with ibuprofen as needed for pain.  If necessary, " you may have stronger pain medication that was sent home with you.         Silva Tinajero PA-C  [facility], [place]  [phone]        Again, thank you for allowing me to participate in the care of your patient.        Sincerely,        Silva Tinajero PA-C

## 2020-06-30 ENCOUNTER — MEDICAL CORRESPONDENCE (OUTPATIENT)
Dept: HEALTH INFORMATION MANAGEMENT | Facility: HOSPITAL | Age: 70
End: 2020-06-30

## 2020-08-11 ENCOUNTER — HOSPITAL ENCOUNTER (OUTPATIENT)
Dept: BONE DENSITY | Facility: HOSPITAL | Age: 70
Discharge: HOME | End: 2020-08-11
Attending: FAMILY MEDICINE | Admitting: FAMILY MEDICINE
Payer: MEDICARE

## 2020-08-11 DIAGNOSIS — E03.8 OTHER SPECIFIED HYPOTHYROIDISM: ICD-10-CM

## 2020-08-11 DIAGNOSIS — Z78.0 POSTMENOPAUSAL ESTROGEN DEFICIENCY: ICD-10-CM

## 2020-08-11 PROCEDURE — 77080 DXA BONE DENSITY AXIAL: CPT | Mod: TC

## 2020-12-28 ENCOUNTER — TRANSFERRED RECORDS (OUTPATIENT)
Dept: HEALTH INFORMATION MANAGEMENT | Facility: CLINIC | Age: 70
End: 2020-12-28

## 2021-01-08 ENCOUNTER — VIRTUAL VISIT (OUTPATIENT)
Dept: OTOLARYNGOLOGY | Facility: OTHER | Age: 71
End: 2021-01-08
Attending: NURSE PRACTITIONER
Payer: MEDICARE

## 2021-01-08 DIAGNOSIS — G20.A1 PARKINSON DISEASE (H): ICD-10-CM

## 2021-01-08 DIAGNOSIS — J30.89 PERENNIAL ALLERGIC RHINITIS: Primary | ICD-10-CM

## 2021-01-08 DIAGNOSIS — R09.81 NASAL CONGESTION: ICD-10-CM

## 2021-01-08 PROCEDURE — 99443 PR PHYSICIAN TELEPHONE EVALUATION 21-30 MIN: CPT | Mod: 95 | Performed by: NURSE PRACTITIONER

## 2021-01-08 RX ORDER — GUAIFENESIN 600 MG/1
1200 TABLET, EXTENDED RELEASE ORAL 2 TIMES DAILY PRN
Qty: 28 TABLET | Refills: 3 | Status: SHIPPED | OUTPATIENT
Start: 2021-01-08

## 2021-01-08 RX ORDER — FLUTICASONE PROPIONATE 50 MCG
2 SPRAY, SUSPENSION (ML) NASAL DAILY
Qty: 16 G | Refills: 3 | Status: SHIPPED | OUTPATIENT
Start: 2021-01-08

## 2021-01-08 RX ORDER — CETIRIZINE HYDROCHLORIDE 10 MG/1
10 TABLET ORAL DAILY
Qty: 60 TABLET | Refills: 3 | Status: SHIPPED | OUTPATIENT
Start: 2021-01-08

## 2021-01-08 NOTE — PATIENT INSTRUCTIONS
Start nasal saline rinses 1-2 times per day  Continue Zyrtec 10 mg 1-2 times per day  May wean off Singulair, take 1/2 pill daily x 7 days then discontinue.  If wheezing or shortness of breath occurs start using this again  Allergy testing is contranidicated due to metoprolol.  She can discuss with Dr Hidalgo but doubt that she would want her to come off of that due to history of SVT.  Will continue conservative measures with medication therapy at this time.  I will see her in the clinic for nasal exam and see how she is doing.        Thank you for allowing Robyn GARRIDO and our ENT team to participate in your care.  If your medications are too expensive, please call my nurse at the number listed below.  We can possibly change this medication.    If you have a scheduling or an appointment question please contact our Health Unit Coordinator at their direct line [phone].   ALL nursing questions or concerns can be directed to your ENT nurses at: [phone] or [phone].  '

## 2021-01-08 NOTE — PROGRESS NOTES
Tamra is a 70 year old who is being evaluated via a billable telephone visit.      What phone number would you like to be contacted at? [phone], if buzzing in phone can call [phone]    How would you like to obtain your AVS? Mail a copy       Otolaryngology Note         Chief Complaint:     Patient presents with:  Allergy Consult           History of Present Illness:       Tamra is a 70 year old female I am seeing today via telephone visit for concerns for nasal congestion and throat congestion, thick mucous in the throat.  She has to cough up the mucous.  She has been feeling stuffy at night, wasn't able to breath through her nose.  Symptoms have been present for the past 2 weeks with recent improvement.  She has itchy nose and sneezing, worse in the living room of her house, this has gotten better recently.  No fevers or chills.        She has a history of seasonal allergies, has been on immunotherapy in the past through [facility] about 20 years ago.  She was allergic to weeds, grass.  She also has had reactions to cats in the past, had a dog in that didn't seem to bother her.      She is currently taking flonase, Singulair, zyrtec twice daily.  She feels that this has been helping.  She is also rinsing with saline and using humidifier.  She is also taking mucinex with good improvement.  Breathing is difficult at time with throat congestion, better after mucinex.   She states she doesn't like the Singulair and would like to try to stop taking.      She is able breath through her nose, she can smell and taste.  She has post nasal drainage that she is able to cough up.  Appetite has been good.   She has been feeling fatigued.  She has flovent inhaler, has been using consistently.  She does not have albuterol inhaler.  She reports mucinex helps with shortness of breath.       No history of sinus injury/trauma/surgery  Occasional acid reflux, no dysphagia or sore throat  No history of COM, otological  surgery  + family history of allergies - mother     Resides in house with a basement. There is no water or mold.           Medications:     Current Outpatient Rx   Medication Sig Dispense Refill     calcium-vitamin D (CALTRATE) 600-400 MG-UNIT per tablet Take 1 tablet by mouth 2 times daily       carbidopa-levodopa (SINEMET)  MG per tablet Take 1 tablet by mouth 4 times daily May take an additional 1/2 tablet up to twice daily between doses for breakthrough symtoms.       cetirizine (ZYRTEC) 10 MG tablet Take 1 tablet (10 mg) by mouth daily 60 tablet 3     cetirizine (ZYRTEC) 10 MG tablet Take 10 mg by mouth daily as needed for allergies       diltiazem ER (TIAZAC) 180 MG 24 hr ER beaded capsule Take 180 mg by mouth daily       diphenhydrAMINE-acetaminophen (TYLENOL PM)  MG tablet Take 1 tablet by mouth nightly as needed for sleep       fluticasone (FLONASE) 50 MCG/ACT nasal spray Spray 2 sprays into both nostrils daily 16 g 3     guaiFENesin (MUCINEX) 600 MG 12 hr tablet Take 2 tablets (1,200 mg) by mouth 2 times daily as needed for congestion 28 tablet 3     Levothyroxine Sodium (SYNTHROID PO) Take 175 mcg by mouth daily        METOPROLOL SUCCINATE ER PO Take 100 mg by mouth daily        montelukast (SINGULAIR) 10 MG tablet Take 1 tablet (10 mg) by mouth At Bedtime 30 tablet 11     Multiple Vitamin (MULTI VITAMIN DAILY PO) Take 1 tablet by mouth daily       SIMVASTATIN PO Take 10 mg by mouth At Bedtime       triamcinolone (KENALOG) 0.1 % cream Apply topically 2 times daily        COENZYME Q10 PO Take 1 capsule by mouth daily       fluticasone (FLONASE) 50 MCG/ACT spray Spray 2 sprays into both nostrils daily        fluticasone (FLOVENT HFA) 220 MCG/ACT Inhaler Inhale 2 puffs into the lungs 2 times daily              Allergies:     Allergies: Food and No clinical screening - see comments          Past Medical History:     No past medical history on file.         Past Surgical History:     Past Surgical  History:   Procedure Laterality Date     COLONOSCOPY N/A 4/18/2018    Procedure: COLONOSCOPY;  FLEXIBLE  SIGMOIDOSCOPY WITH POLYPECTOMY;  Surgeon: Rashad Nickerson DO;  Location: [facility] OR     EXCISE BONE CYST SUBMANDIBULAR Right 11/20/2019    Procedure: EXCISION RIGHT MANDIBULAR CYST WITH NERVE MONITORING AND POSSIBLE FROZEN SECTIONS;  Surgeon: Viktoria Johnson MD;  Location: [facility] OR       ENT family history reviewed         Social History:     Social History     Tobacco Use     Smoking status: Never Smoker     Smokeless tobacco: Never Used   Substance Use Topics     Alcohol use: Not on file     Drug use: Not on file            Review of Systems:     ROS: See HPI         Physical Exam:     General - The patient is alert and oriented to person and place, answers questions and cooperates with telephone appointment  appropriately.   Voice and Breathing - The patient was talking in full sentences without audible signs of shortness of breath.  There was no audible wheezing, stridor, or stertor.  The patients voice was clear and strong, and had appropriate pitch and quality.         Assessment and Plan:       ICD-10-CM    1. Perennial allergic rhinitis  J30.89 fluticasone (FLONASE) 50 MCG/ACT nasal spray     guaiFENesin (MUCINEX) 600 MG 12 hr tablet     cetirizine (ZYRTEC) 10 MG tablet    per personal history   2. Nasal congestion  R09.81 fluticasone (FLONASE) 50 MCG/ACT nasal spray     guaiFENesin (MUCINEX) 600 MG 12 hr tablet     cetirizine (ZYRTEC) 10 MG tablet   3. Parkinson disease (H)  G20      Start nasal saline rinses 1-2 times per day  Continue Zyrtec 10 mg 1-2 times per day  May wean off Singulair, take 1/2 pill daily x 7 days then discontinue.  If wheezing or shortness of breath occurs start using this again  Allergy testing is contranidicated due to metoprolol.  She can discuss with Dr Hidalgo but doubt that she would want her to come off of that due to history of SVT.  Will continue conservative measures  with medication therapy at this time.  I will see her in the clinic for nasal exam and see how she is doing.        Robyn Steiner NP-C  [facility] ENT      Phone call duration: 24 minutes

## 2021-01-08 NOTE — LETTER
1/8/2021         RE: Tamra Dc  [address]  [place] [address]        Dear Colleague,    Thank you for referring your patient, Tamra Dc, to the [facility]. Please see a copy of my visit note below.    Tamra is a 70 year old who is being evaluated via a billable telephone visit.      What phone number would you like to be contacted at? [phone], if buzzing in phone can call [phone]    How would you like to obtain your AVS? Mail a copy       Otolaryngology Note         Chief Complaint:     Patient presents with:  Allergy Consult           History of Present Illness:       Tamra is a 70 year old female I am seeing today via telephone visit for concerns for nasal congestion and throat congestion, thick mucous in the throat.  She has to cough up the mucous.  She has been feeling stuffy at night, wasn't able to breath through her nose.  Symptoms have been present for the past 2 weeks with recent improvement.  She has itchy nose and sneezing, worse in the living room of her house, this has gotten better recently.  No fevers or chills.        She has a history of seasonal allergies, has been on immunotherapy in the past through [facility] about 20 years ago.  She was allergic to weeds, grass.  She also has had reactions to cats in the past, had a dog in that didn't seem to bother her.      She is currently taking flonase, Singulair, zyrtec twice daily.  She feels that this has been helping.  She is also rinsing with saline and using humidifier.  She is also taking mucinex with good improvement.  Breathing is difficult at time with throat congestion, better after mucinex.   She states she doesn't like the Singulair and would like to try to stop taking.      She is able breath through her nose, she can smell and taste.  She has post nasal drainage that she is able to cough up.  Appetite has been good.   She has been feeling fatigued.  She has flovent inhaler, has been using  consistently.  She does not have albuterol inhaler.  She reports mucinex helps with shortness of breath.       No history of sinus injury/trauma/surgery  Occasional acid reflux, no dysphagia or sore throat  No history of COM, otological surgery  + family history of allergies - mother     Resides in house with a basement. There is no water or mold.           Medications:     Current Outpatient Rx   Medication Sig Dispense Refill     calcium-vitamin D (CALTRATE) 600-400 MG-UNIT per tablet Take 1 tablet by mouth 2 times daily       carbidopa-levodopa (SINEMET)  MG per tablet Take 1 tablet by mouth 4 times daily May take an additional 1/2 tablet up to twice daily between doses for breakthrough symtoms.       cetirizine (ZYRTEC) 10 MG tablet Take 1 tablet (10 mg) by mouth daily 60 tablet 3     cetirizine (ZYRTEC) 10 MG tablet Take 10 mg by mouth daily as needed for allergies       diltiazem ER (TIAZAC) 180 MG 24 hr ER beaded capsule Take 180 mg by mouth daily       diphenhydrAMINE-acetaminophen (TYLENOL PM)  MG tablet Take 1 tablet by mouth nightly as needed for sleep       fluticasone (FLONASE) 50 MCG/ACT nasal spray Spray 2 sprays into both nostrils daily 16 g 3     guaiFENesin (MUCINEX) 600 MG 12 hr tablet Take 2 tablets (1,200 mg) by mouth 2 times daily as needed for congestion 28 tablet 3     Levothyroxine Sodium (SYNTHROID PO) Take 175 mcg by mouth daily        METOPROLOL SUCCINATE ER PO Take 100 mg by mouth daily        montelukast (SINGULAIR) 10 MG tablet Take 1 tablet (10 mg) by mouth At Bedtime 30 tablet 11     Multiple Vitamin (MULTI VITAMIN DAILY PO) Take 1 tablet by mouth daily       SIMVASTATIN PO Take 10 mg by mouth At Bedtime       triamcinolone (KENALOG) 0.1 % cream Apply topically 2 times daily        COENZYME Q10 PO Take 1 capsule by mouth daily       fluticasone (FLONASE) 50 MCG/ACT spray Spray 2 sprays into both nostrils daily        fluticasone (FLOVENT HFA) 220 MCG/ACT Inhaler Inhale 2  puffs into the lungs 2 times daily              Allergies:     Allergies: Food and No clinical screening - see comments          Past Medical History:     No past medical history on file.         Past Surgical History:     Past Surgical History:   Procedure Laterality Date     COLONOSCOPY N/A 4/18/2018    Procedure: COLONOSCOPY;  FLEXIBLE  SIGMOIDOSCOPY WITH POLYPECTOMY;  Surgeon: Rashad Nickerson DO;  Location: [facility]     EXCISE BONE CYST SUBMANDIBULAR Right 11/20/2019    Procedure: EXCISION RIGHT MANDIBULAR CYST WITH NERVE MONITORING AND POSSIBLE FROZEN SECTIONS;  Surgeon: Viktoria Johnson MD;  Location: [facility] OR       ENT family history reviewed         Social History:     Social History     Tobacco Use     Smoking status: Never Smoker     Smokeless tobacco: Never Used   Substance Use Topics     Alcohol use: Not on file     Drug use: Not on file            Review of Systems:     ROS: See HPI         Physical Exam:     General - The patient is alert and oriented to person and place, answers questions and cooperates with telephone appointment  appropriately.   Voice and Breathing - The patient was talking in full sentences without audible signs of shortness of breath.  There was no audible wheezing, stridor, or stertor.  The patients voice was clear and strong, and had appropriate pitch and quality.         Assessment and Plan:       ICD-10-CM    1. Perennial allergic rhinitis  J30.89 fluticasone (FLONASE) 50 MCG/ACT nasal spray     guaiFENesin (MUCINEX) 600 MG 12 hr tablet     cetirizine (ZYRTEC) 10 MG tablet    per personal history   2. Nasal congestion  R09.81 fluticasone (FLONASE) 50 MCG/ACT nasal spray     guaiFENesin (MUCINEX) 600 MG 12 hr tablet     cetirizine (ZYRTEC) 10 MG tablet   3. Parkinson disease (H)  G20      Start nasal saline rinses 1-2 times per day  Continue Zyrtec 10 mg 1-2 times per day  May wean off Singulair, take 1/2 pill daily x 7 days then discontinue.  If wheezing or shortness of  breath occurs start using this again  Allergy testing is contranidicated due to metoprolol.  She can discuss with Dr Hidalgo but doubt that she would want her to come off of that due to history of SVT.  Will continue conservative measures with medication therapy at this time.  I will see her in the clinic for nasal exam and see how she is doing.        Robyn GARRIDO  [facility] ENT      Phone call duration: 24 minutes        Again, thank you for allowing me to participate in the care of your patient.        Sincerely,        Robyn Steiner, [place]

## 2021-01-12 ENCOUNTER — OFFICE VISIT (OUTPATIENT)
Dept: OTOLARYNGOLOGY | Facility: OTHER | Age: 71
End: 2021-01-12
Attending: NURSE PRACTITIONER
Payer: MEDICARE

## 2021-01-12 VITALS
OXYGEN SATURATION: 95 % | HEIGHT: 65 IN | RESPIRATION RATE: 20 BRPM | DIASTOLIC BLOOD PRESSURE: 82 MMHG | SYSTOLIC BLOOD PRESSURE: 137 MMHG | TEMPERATURE: 97.3 F | HEART RATE: 76 BPM | BODY MASS INDEX: 28.29 KG/M2

## 2021-01-12 DIAGNOSIS — J30.89 PERENNIAL ALLERGIC RHINITIS: ICD-10-CM

## 2021-01-12 DIAGNOSIS — R09.81 NASAL CONGESTION: Primary | ICD-10-CM

## 2021-01-12 DIAGNOSIS — R09.82 POST-NASAL DRIP: ICD-10-CM

## 2021-01-12 DIAGNOSIS — G20.A1 PARKINSON'S DISEASE (H): ICD-10-CM

## 2021-01-12 PROCEDURE — 31231 NASAL ENDOSCOPY DX: CPT | Performed by: NURSE PRACTITIONER

## 2021-01-12 PROCEDURE — 99213 OFFICE O/P EST LOW 20 MIN: CPT | Mod: 25 | Performed by: NURSE PRACTITIONER

## 2021-01-12 PROCEDURE — G0463 HOSPITAL OUTPT CLINIC VISIT: HCPCS | Mod: 25

## 2021-01-12 RX ORDER — DILTIAZEM HYDROCHLORIDE 180 MG/1
CAPSULE, COATED, EXTENDED RELEASE ORAL
COMMUNITY
Start: 2021-01-11 | End: 2021-01-12

## 2021-01-12 ASSESSMENT — PAIN SCALES - GENERAL: PAINLEVEL: NO PAIN (0)

## 2021-01-12 NOTE — PATIENT INSTRUCTIONS
Follow up in 2-3 months for recheck  Continue zyrtec 10 mg 1-2 time per day  Use mumtaz med sinus rinses 1-2 times per day  Continue flonase 2 sprays daily      Thank you for allowing Robyn GARRIDO and our ENT team to participate in your care.  If your medications are too expensive, please call my nurse at the number listed below.  We can possibly change this medication.    If you have a scheduling or an appointment question please contact our Health Unit Coordinator at their direct line [phone].   ALL nursing questions or concerns can be directed to your ENT nurses at: [phone] or [phone].

## 2021-01-12 NOTE — PROGRESS NOTES
Otolaryngology Note         Chief Complaint:     Patient presents with:  Follow Up: perennial allergies           History of Present Illness:     Tamra Dc is a 70 year old female seen today for follow up of virtual appointment on 1/8/2021:    Notes from 1/8/2021:    Tamra has concerns for nasal congestion and throat congestion, thick mucous in the throat.  She has to cough up the mucous.  She has been feeling stuffy at night, wasn't able to breath through her nose.  Symptoms have been present for the past 2 weeks with recent improvement.  She has itchy nose and sneezing, worse in the living room of her house (questions exposure to plants?) this has gotten better recently.  No fevers or chills.         She has a history of seasonal allergies, has been on immunotherapy in the past through [facility] about 20 years ago.  She was allergic to weeds, grass.  She also has had reactions to cats in the past, had a dog in that didn't seem to bother her.       She is currently taking flonase, Singulair, zyrtec twice daily.  She feels that this has been helping.  She is also rinsing with saline and using humidifier.  She is also taking mucinex with good improvement.  Breathing is difficult at time with throat congestion, better after mucinex.   She states she doesn't like the Singulair and would like to try to stop taking.       She is able breath through her nose, she can smell and taste.  She has post nasal drainage that she is able to cough up.  Appetite has been good.   She has been feeling fatigued.  She has flovent inhaler, has been using consistently.  She does not have albuterol inhaler.  She reports mucinex helps with shortness of breath.       No history of sinus injury/trauma/surgery  Occasional acid reflux, no dysphagia or sore throat  No history of COM, otological surgery  + family history of allergies - mother     Today she reports improvement in symptoms.  She increased nasal rinses over the past week with  improvement.  She reports that he she has not been waking up without nasal congestion. No further c/o thick mucous or frequent cough.      No sore throat or ear pain.  No concerns for decreased hearing, flux hearing, vertigo or aural fullness.  No concerns for thick purulent drainage from the nose.     Tamra has a history of Parkinson's.  She is currently on Sinemet.  She lives in her own home.           Medications:     Current Outpatient Rx   Medication Sig Dispense Refill     calcium-vitamin D (CALTRATE) 600-400 MG-UNIT per tablet Take 1 tablet by mouth 2 times daily       carbidopa-levodopa (SINEMET)  MG per tablet Take 1 tablet by mouth 4 times daily May take an additional 1/2 tablet up to twice daily between doses for breakthrough symtoms.       cetirizine (ZYRTEC) 10 MG tablet Take 1 tablet (10 mg) by mouth daily 60 tablet 3     COENZYME Q10 PO Take 1 capsule by mouth daily       diltiazem ER (TIAZAC) 180 MG 24 hr ER beaded capsule Take 180 mg by mouth daily       diphenhydrAMINE-acetaminophen (TYLENOL PM)  MG tablet Take 1 tablet by mouth nightly as needed for sleep       fluticasone (FLONASE) 50 MCG/ACT nasal spray Spray 2 sprays into both nostrils daily 16 g 3     fluticasone (FLONASE) 50 MCG/ACT spray Spray 2 sprays into both nostrils daily        fluticasone (FLOVENT HFA) 220 MCG/ACT Inhaler Inhale 2 puffs into the lungs 2 times daily       guaiFENesin (MUCINEX) 600 MG 12 hr tablet Take 2 tablets (1,200 mg) by mouth 2 times daily as needed for congestion 28 tablet 3     Levothyroxine Sodium (SYNTHROID PO) Take 175 mcg by mouth daily        METOPROLOL SUCCINATE ER PO Take 100 mg by mouth daily        montelukast (SINGULAIR) 10 MG tablet Take 1 tablet (10 mg) by mouth At Bedtime 30 tablet 11     Multiple Vitamin (MULTI VITAMIN DAILY PO) Take 1 tablet by mouth daily       SIMVASTATIN PO Take 10 mg by mouth At Bedtime       triamcinolone (KENALOG) 0.1 % cream Apply topically 2 times daily     "           Allergies:     Allergies: Food and No clinical screening - see comments          Past Medical History:     No past medical history on file.         Past Surgical History:     Past Surgical History:   Procedure Laterality Date     COLONOSCOPY N/A 4/18/2018    Procedure: COLONOSCOPY;  FLEXIBLE  SIGMOIDOSCOPY WITH POLYPECTOMY;  Surgeon: Rashad Nickerson DO;  Location: [facility] OR     EXCISE BONE CYST SUBMANDIBULAR Right 11/20/2019    Procedure: EXCISION RIGHT MANDIBULAR CYST WITH NERVE MONITORING AND POSSIBLE FROZEN SECTIONS;  Surgeon: Viktoria Johnson MD;  Location: [facility] OR       ENT family history reviewed         Social History:     Social History     Tobacco Use     Smoking status: Never Smoker     Smokeless tobacco: Never Used   Substance Use Topics     Alcohol use: None     Drug use: None            Review of Systems:     ROS: See HPI         Physical Exam:     /82 (BP Location: Left arm, Patient Position: Chair, Cuff Size: Adult Regular)   Pulse 76   Temp 97.3  F (36.3  C) (Tympanic)   Resp 20   Ht 1.651 m (5' 5\")   SpO2 95%   BMI 28.29 kg/m      General - The patient is well nourished and well developed, and appears to have good nutritional status.  Alert and oriented to person and place, answers questions and cooperates with examination appropriately.   Head and Face - Normocephalic and atraumatic, with no gross asymmetry noted.  The facial nerve is intact, with strong symmetric movements.  Voice and Breathing - The patient was breathing comfortably without the use of accessory muscles. There was no wheezing, stridor. The patients voice was clear and strong, and had appropriate pitch and quality.  Ears - She has bilateral hearing aids.  External ear normal. Canals are patent. Right tympanic membrane is intact without effusion, retraction or mass. Left tympanic membrane is intact without effusion, retraction or mass.  Eyes - Extraocular movements intact, sclera were not icteric or " injected.  Mouth - Examination of the oral cavity showed pink, healthy oral mucosa. Dentition in good condition. No lesions or ulcerations noted. The tongue was mobile and midline.   Throat - The walls of the oropharynx were smooth, pink, moist, symmetric, and had no lesions or ulcerations.  The tonsillar pillars and soft palate were symmetric. The uvula was midline on elevation.    Neck - Normal midline excursion of the laryngotracheal complex during swallowing.  Full range of motion on passive movement.  Palpation of the occipital, submental, submandibular, internal jugular chain, and supraclavicular nodes did not demonstrate any abnormal lymph nodes or masses.  Palpation of the thyroid was soft and smooth, with no nodules or goiter appreciated.  The trachea was mobile and midline.  Nose - External contour is symmetric, no gross deflection or scars.  Nasal mucosa is pink and moist with no abnormal mucus.  The septum and turbinates were evaluated: with nasal speculum, grossly normal with thin mucin on the inferior turbinates.  No polyps, masses, or purulence noted on examination.    To evaluate the nose and sinuses, I performed rigid nasal endoscopy.  I sprayed both nares with 2 sprays lidocaine and neosynephrine.     I began with the RIGHT side using a 0 degree rigid nasal endoscope, and then similarly examined the LEFT side     Findings:  Inferior turbinates:  minimally enlarged, thin mucin present, no purulent drainage.   Middle turbinate and middle meatus:  No purulence, no polyposis  Mucosa is healthy throughout,  no polyps nor polypoid degeneration  Nasopharynx with minimal amount of dry crusted sputum on the posterior wall of the nasopharynx, ET patent, no edema  The patient tolerated the procedure well            Assessment and Plan:       ICD-10-CM    1. Nasal congestion  R09.81    2. Post-nasal drip  R09.82    3. Perennial allergic rhinitis  J30.89    4. Parkinson's disease (H)  G20      Symptoms are  acute and gradually improving.  Will continue plan.   Follow up in 2-3 months for recheck  Continue zyrtec 10 mg 1-2 time per day  Use mumtaz med sinus rinses 1-2 times per day  Continue flonase 2 sprays daily    Robyn GARRIDO  [facility]      Scope # [id number]

## 2021-01-12 NOTE — NURSING NOTE
"Chief Complaint   Patient presents with     Follow Up     perennial allergies       Initial /82 (BP Location: Left arm, Patient Position: Chair, Cuff Size: Adult Regular)   Pulse 76   Temp 97.3  F (36.3  C) (Tympanic)   Resp 20   Ht 1.651 m (5' 5\")   SpO2 95%   BMI 28.29 kg/m   Estimated body mass index is 28.29 kg/m  as calculated from the following:    Height as of this encounter: 1.651 m (5' 5\").    Weight as of 11/27/19: 77.1 kg (170 lb).  Medication Reconciliation: complete  Olivia Benjamin LPN    "

## 2021-01-12 NOTE — LETTER
1/12/2021         RE: Tamra Dc  [address] [place] [address]        Dear Colleague,    Thank you for referring your patient, Tamra Dc, to the [facility]. Please see a copy of my visit note below.    Otolaryngology Note         Chief Complaint:     Patient presents with:  Follow Up: perennial allergies           History of Present Illness:     Tamra Dc is a 70 year old female seen today for follow up of virtual appointment on 1/8/2021:    Notes from 1/8/2021:    Tamra has concerns for nasal congestion and throat congestion, thick mucous in the throat.  She has to cough up the mucous.  She has been feeling stuffy at night, wasn't able to breath through her nose.  Symptoms have been present for the past 2 weeks with recent improvement.  She has itchy nose and sneezing, worse in the living room of her house (questions exposure to plants?) this has gotten better recently.  No fevers or chills.         She has a history of seasonal allergies, has been on immunotherapy in the past through [facility] about 20 years ago.  She was allergic to weeds, grass.  She also has had reactions to cats in the past, had a dog in that didn't seem to bother her.       She is currently taking flonase, Singulair, zyrtec twice daily.  She feels that this has been helping.  She is also rinsing with saline and using humidifier.  She is also taking mucinex with good improvement.  Breathing is difficult at time with throat congestion, better after mucinex.   She states she doesn't like the Singulair and would like to try to stop taking.       She is able breath through her nose, she can smell and taste.  She has post nasal drainage that she is able to cough up.  Appetite has been good.   She has been feeling fatigued.  She has flovent inhaler, has been using consistently.  She does not have albuterol inhaler.  She reports mucinex helps with shortness of breath.       No history of sinus  injury/trauma/surgery  Occasional acid reflux, no dysphagia or sore throat  No history of COM, otological surgery  + family history of allergies - mother     Today she reports improvement in symptoms.  She increased nasal rinses over the past week with improvement.  She reports that he she has not been waking up without nasal congestion. No further c/o thick mucous or frequent cough.      No sore throat or ear pain.  No concerns for decreased hearing, flux hearing, vertigo or aural fullness.  No concerns for thick purulent drainage from the nose.     Tamra has a history of Parkinson's.  She is currently on Sinemet.  She lives in her own home.           Medications:     Current Outpatient Rx   Medication Sig Dispense Refill     calcium-vitamin D (CALTRATE) 600-400 MG-UNIT per tablet Take 1 tablet by mouth 2 times daily       carbidopa-levodopa (SINEMET)  MG per tablet Take 1 tablet by mouth 4 times daily May take an additional 1/2 tablet up to twice daily between doses for breakthrough symtoms.       cetirizine (ZYRTEC) 10 MG tablet Take 1 tablet (10 mg) by mouth daily 60 tablet 3     COENZYME Q10 PO Take 1 capsule by mouth daily       diltiazem ER (TIAZAC) 180 MG 24 hr ER beaded capsule Take 180 mg by mouth daily       diphenhydrAMINE-acetaminophen (TYLENOL PM)  MG tablet Take 1 tablet by mouth nightly as needed for sleep       fluticasone (FLONASE) 50 MCG/ACT nasal spray Spray 2 sprays into both nostrils daily 16 g 3     fluticasone (FLONASE) 50 MCG/ACT spray Spray 2 sprays into both nostrils daily        fluticasone (FLOVENT HFA) 220 MCG/ACT Inhaler Inhale 2 puffs into the lungs 2 times daily       guaiFENesin (MUCINEX) 600 MG 12 hr tablet Take 2 tablets (1,200 mg) by mouth 2 times daily as needed for congestion 28 tablet 3     Levothyroxine Sodium (SYNTHROID PO) Take 175 mcg by mouth daily        METOPROLOL SUCCINATE ER PO Take 100 mg by mouth daily        montelukast (SINGULAIR) 10 MG tablet Take 1  "tablet (10 mg) by mouth At Bedtime 30 tablet 11     Multiple Vitamin (MULTI VITAMIN DAILY PO) Take 1 tablet by mouth daily       SIMVASTATIN PO Take 10 mg by mouth At Bedtime       triamcinolone (KENALOG) 0.1 % cream Apply topically 2 times daily               Allergies:     Allergies: Food and No clinical screening - see comments          Past Medical History:     No past medical history on file.         Past Surgical History:     Past Surgical History:   Procedure Laterality Date     COLONOSCOPY N/A 4/18/2018    Procedure: COLONOSCOPY;  FLEXIBLE  SIGMOIDOSCOPY WITH POLYPECTOMY;  Surgeon: Rashad Nickerson DO;  Location: [facility] OR     EXCISE BONE CYST SUBMANDIBULAR Right 11/20/2019    Procedure: EXCISION RIGHT MANDIBULAR CYST WITH NERVE MONITORING AND POSSIBLE FROZEN SECTIONS;  Surgeon: Viktoria Johnson MD;  Location: [facility] OR       ENT family history reviewed         Social History:     Social History     Tobacco Use     Smoking status: Never Smoker     Smokeless tobacco: Never Used   Substance Use Topics     Alcohol use: None     Drug use: None            Review of Systems:     ROS: See HPI         Physical Exam:     /82 (BP Location: Left arm, Patient Position: Chair, Cuff Size: Adult Regular)   Pulse 76   Temp 97.3  F (36.3  C) (Tympanic)   Resp 20   Ht 1.651 m (5' 5\")   SpO2 95%   BMI 28.29 kg/m      General - The patient is well nourished and well developed, and appears to have good nutritional status.  Alert and oriented to person and place, answers questions and cooperates with examination appropriately.   Head and Face - Normocephalic and atraumatic, with no gross asymmetry noted.  The facial nerve is intact, with strong symmetric movements.  Voice and Breathing - The patient was breathing comfortably without the use of accessory muscles. There was no wheezing, stridor. The patients voice was clear and strong, and had appropriate pitch and quality.  Ears - She has bilateral hearing aids.  " External ear normal. Canals are patent. Right tympanic membrane is intact without effusion, retraction or mass. Left tympanic membrane is intact without effusion, retraction or mass.  Eyes - Extraocular movements intact, sclera were not icteric or injected.  Mouth - Examination of the oral cavity showed pink, healthy oral mucosa. Dentition in good condition. No lesions or ulcerations noted. The tongue was mobile and midline.   Throat - The walls of the oropharynx were smooth, pink, moist, symmetric, and had no lesions or ulcerations.  The tonsillar pillars and soft palate were symmetric. The uvula was midline on elevation.    Neck - Normal midline excursion of the laryngotracheal complex during swallowing.  Full range of motion on passive movement.  Palpation of the occipital, submental, submandibular, internal jugular chain, and supraclavicular nodes did not demonstrate any abnormal lymph nodes or masses.  Palpation of the thyroid was soft and smooth, with no nodules or goiter appreciated.  The trachea was mobile and midline.  Nose - External contour is symmetric, no gross deflection or scars.  Nasal mucosa is pink and moist with no abnormal mucus.  The septum and turbinates were evaluated: with nasal speculum, grossly normal with thin mucin on the inferior turbinates.  No polyps, masses, or purulence noted on examination.    To evaluate the nose and sinuses, I performed rigid nasal endoscopy.  I sprayed both nares with 2 sprays lidocaine and neosynephrine.     I began with the RIGHT side using a 0 degree rigid nasal endoscope, and then similarly examined the LEFT side     Findings:  Inferior turbinates:  minimally enlarged, thin mucin present, no purulent drainage.   Middle turbinate and middle meatus:  No purulence, no polyposis  Mucosa is healthy throughout,  no polyps nor polypoid degeneration  Nasopharynx with minimal amount of dry crusted sputum on the posterior wall of the nasopharynx, ET patent, no  edema  The patient tolerated the procedure well            Assessment and Plan:       ICD-10-CM    1. Nasal congestion  R09.81    2. Post-nasal drip  R09.82    3. Perennial allergic rhinitis  J30.89    4. Parkinson's disease (H)  G20      Symptoms are acute and gradually improving.  Will continue plan.   Follow up in 2-3 months for recheck  Continue zyrtec 10 mg 1-2 time per day  Use mumtaz med sinus rinses 1-2 times per day  Continue flonase 2 sprays daily    Robyn GARRIDO  [facility]      Scope # [id number]        Again, thank you for allowing me to participate in the care of your patient.        Sincerely,        Robyn Steiner NP

## 2021-03-30 NOTE — PATIENT INSTRUCTIONS
Thank you for allowing Robyn Steiner NP and our ENT team to participate in your care.  If your medications are too expensive, please give the nurse a call.  We can possibly change this medication.  If you have a scheduling or an appointment question please contact our Health Unit Coordinator at their direct line [phone].   ALL nursing questions or concerns can be directed to your ENT nurse at: [phone] [facility] - [phone]      Use ocean nasal spray 3-4 times per day as needed for nasal congestion  Stop flonase  Start astelin nasal spray  Follow up in 6 months for recheck

## 2021-03-31 ENCOUNTER — OFFICE VISIT (OUTPATIENT)
Dept: OTOLARYNGOLOGY | Facility: OTHER | Age: 71
End: 2021-03-31
Attending: NURSE PRACTITIONER
Payer: MEDICARE

## 2021-03-31 VITALS
SYSTOLIC BLOOD PRESSURE: 134 MMHG | DIASTOLIC BLOOD PRESSURE: 62 MMHG | OXYGEN SATURATION: 95 % | HEART RATE: 74 BPM | BODY MASS INDEX: 25.71 KG/M2 | TEMPERATURE: 97.3 F | HEIGHT: 66 IN | WEIGHT: 160 LBS

## 2021-03-31 DIAGNOSIS — R09.82 POST-NASAL DRIP: ICD-10-CM

## 2021-03-31 DIAGNOSIS — J30.89 PERENNIAL ALLERGIC RHINITIS: ICD-10-CM

## 2021-03-31 DIAGNOSIS — R09.81 NASAL CONGESTION: Primary | ICD-10-CM

## 2021-03-31 PROCEDURE — G0463 HOSPITAL OUTPT CLINIC VISIT: HCPCS

## 2021-03-31 PROCEDURE — 99213 OFFICE O/P EST LOW 20 MIN: CPT | Performed by: NURSE PRACTITIONER

## 2021-03-31 RX ORDER — AZELASTINE 1 MG/ML
2 SPRAY, METERED NASAL 2 TIMES DAILY
Qty: 30 ML | Refills: 3 | Status: SHIPPED | OUTPATIENT
Start: 2021-03-31

## 2021-03-31 ASSESSMENT — MIFFLIN-ST. JEOR: SCORE: 1262.51

## 2021-03-31 ASSESSMENT — PAIN SCALES - GENERAL: PAINLEVEL: NO PAIN (0)

## 2021-03-31 NOTE — NURSING NOTE
"Chief Complaint   Patient presents with     Follow Up     Nasal Congestion, PND, Allergies       Initial /62 (BP Location: Right arm, Patient Position: Sitting, Cuff Size: Adult Regular)   Pulse 74   Temp 97.3  F (36.3  C) (Tympanic)   Ht 1.676 m (5' 6\")   Wt 72.6 kg (160 lb)   SpO2 95%   BMI 25.82 kg/m   Estimated body mass index is 25.82 kg/m  as calculated from the following:    Height as of this encounter: 1.676 m (5' 6\").    Weight as of this encounter: 72.6 kg (160 lb).  Medication Reconciliation: complete  Sarah Anderson LPN    "

## 2021-03-31 NOTE — PROGRESS NOTES
Otolaryngology Note         Chief Complaint:     Patient presents with:  Follow Up: Nasal Congestion, PND, Allergies           History of Present Illness:     Tamra Dc is a 71 year old female seen today for follow up.     She has post nasal drip and rhinorrhea with sneezing and morning congestion.  No facial pain or pressure.  She also denies all day nasal congestion, mostly an issue in the morning.      She has been taking zyrtec 10 mg daily, Singulair, and flonase.  She reports she does not like the Flonase, causes dryness, would like to try a different nasal spray    She reports that she is trying to get service in to her home to test for mold.  She does not report that she has visible mold.  She is also looking at getting an air purifier for her home.  Advised this may help, certainly shouldn't hurt.  Cautioned against spending large amounts of money expecting resolution of allergy symptoms with air purifier.  She uses humidifier in the winter and notes improvement.      No sore throat or ear pain.  No concerns for decreased hearing, flux hearing, vertigo or aural fullness.  No concerns for thick purulent drainage from the nose.      Tamra has a history of Parkinson's.  She is currently on Sinemet.  She lives in her own home.      Nasal endoscopy completed on 1/12/21 showed minimally enlarged IT's with thin mucin.  No purulence or polyps.  Nasopharynx had small amount of dried drainage, ET's patent, no masses           Medications:     Current Outpatient Rx   Medication Sig Dispense Refill     azelastine (ASTELIN) 0.1 % nasal spray Spray 2 sprays into both nostrils 2 times daily 30 mL 3     calcium-vitamin D (CALTRATE) 600-400 MG-UNIT per tablet Take 1 tablet by mouth 2 times daily       carbidopa-levodopa (SINEMET)  MG per tablet Take 1 tablet by mouth 4 times daily May take an additional 1/2 tablet up to twice daily between doses for breakthrough symtoms.       cetirizine (ZYRTEC) 10 MG tablet Take  1 tablet (10 mg) by mouth daily 60 tablet 3     COENZYME Q10 PO Take 1 capsule by mouth daily       diltiazem ER (TIAZAC) 180 MG 24 hr ER beaded capsule Take 180 mg by mouth daily       fluticasone (FLONASE) 50 MCG/ACT nasal spray Spray 2 sprays into both nostrils daily 16 g 3     fluticasone (FLONASE) 50 MCG/ACT spray Spray 2 sprays into both nostrils daily        fluticasone (FLOVENT HFA) 220 MCG/ACT Inhaler Inhale 2 puffs into the lungs 2 times daily       guaiFENesin (MUCINEX) 600 MG 12 hr tablet Take 2 tablets (1,200 mg) by mouth 2 times daily as needed for congestion 28 tablet 3     Levothyroxine Sodium (SYNTHROID PO) Take 175 mcg by mouth daily        METOPROLOL SUCCINATE ER PO Take 100 mg by mouth daily        montelukast (SINGULAIR) 10 MG tablet Take 1 tablet (10 mg) by mouth At Bedtime 30 tablet 11     Multiple Vitamin (MULTI VITAMIN DAILY PO) Take 1 tablet by mouth daily       SIMVASTATIN PO Take 10 mg by mouth At Bedtime       triamcinolone (KENALOG) 0.1 % cream Apply topically 2 times daily        diphenhydrAMINE-acetaminophen (TYLENOL PM)  MG tablet Take 1 tablet by mouth nightly as needed for sleep              Allergies:     Allergies: Food and No clinical screening - see comments          Past Medical History:     No past medical history on file.         Past Surgical History:     Past Surgical History:   Procedure Laterality Date     COLONOSCOPY N/A 4/18/2018    Procedure: COLONOSCOPY;  FLEXIBLE  SIGMOIDOSCOPY WITH POLYPECTOMY;  Surgeon: Rashad Nickerson DO;  Location: [facility]     EXCISE BONE CYST SUBMANDIBULAR Right 11/20/2019    Procedure: EXCISION RIGHT MANDIBULAR CYST WITH NERVE MONITORING AND POSSIBLE FROZEN SECTIONS;  Surgeon: Viktoria Johnson MD;  Location: [facility] OR       ENT family history reviewed         Social History:     Social History     Tobacco Use     Smoking status: Never Smoker     Smokeless tobacco: Never Used   Substance Use Topics     Alcohol use: None     Drug use:  "None            Review of Systems:     ROS: See HPI         Physical Exam:     /62 (BP Location: Right arm, Patient Position: Sitting, Cuff Size: Adult Regular)   Pulse 74   Temp 97.3  F (36.3  C) (Tympanic)   Ht 1.676 m (5' 6\")   Wt 72.6 kg (160 lb)   SpO2 95%   BMI 25.82 kg/m      General - The patient is well nourished and well developed, and appears to have good nutritional status.  Alert and oriented to person and place, answers questions and cooperates with examination appropriately.   Head and Face - Normocephalic and atraumatic, with no gross asymmetry noted.  The facial nerve is intact, with strong symmetric movements.  Voice and Breathing - The patient was breathing comfortably without the use of accessory muscles. There was no wheezing, stridor. The patients voice was clear and strong, and had appropriate pitch and quality.  Ears - External ear normal. Canals are patent. Right tympanic membrane is intact without effusion, retraction or mass. Left tympanic membrane is intact without effusion, retraction or mass.  Eyes - Extraocular movements intact, sclera were not icteric or injected.  Mouth - Examination of the oral cavity showed pink, healthy oral mucosa. Dentition in good condition. No lesions or ulcerations noted. The tongue was mobile and midline.   Throat - The walls of the oropharynx were smooth, pink, moist, symmetric, and had no lesions or ulcerations.  The tonsillar pillars and soft palate were symmetric. The uvula was midline on elevation.    Neck - Normal midline excursion of the laryngotracheal complex during swallowing.  Full range of motion on passive movement.  Palpation of the occipital, submental, submandibular, internal jugular chain, and supraclavicular nodes did not demonstrate any abnormal lymph nodes or masses.  Palpation of the thyroid was soft and smooth, with no nodules or goiter appreciated.  The trachea was mobile and midline.  Nose - External contour is symmetric, " no gross deflection or scars.  Nasal mucosa is pink and moist with no abnormal mucus.  The septum and turbinates were evaluated with nasal speculum, no polyps, masses, or purulence noted on examination.         Assessment and Plan:       ICD-10-CM    1. Nasal congestion  R09.81 azelastine (ASTELIN) 0.1 % nasal spray   2. Post-nasal drip  R09.82 azelastine (ASTELIN) 0.1 % nasal spray   3. Perennial allergic rhinitis  J30.89 azelastine (ASTELIN) 0.1 % nasal spray       Use ocean nasal spray 3-4 times per day as needed for nasal congestion  Stop flonase  Start astelin nasal spray  Follow up in 6 months for recheck    Robyn Steiner NP-C  [facility]

## 2021-03-31 NOTE — LETTER
3/31/2021         RE: Tamra Dc  [address] [place] [address]        Dear Colleague,    Thank you for referring your patient, Tamra Dc, to the [facility]. Please see a copy of my visit note below.    Otolaryngology Note         Chief Complaint:     Patient presents with:  Follow Up: Nasal Congestion, PND, Allergies           History of Present Illness:     Tamra Dc is a 71 year old female seen today for follow up.     She has post nasal drip and rhinorrhea with sneezing and morning congestion.  No facial pain or pressure.  She also denies all day nasal congestion, mostly an issue in the morning.      She has been taking zyrtec 10 mg daily, Singulair, and flonase.  She reports she does not like the Flonase, causes dryness, would like to try a different nasal spray    She reports that she is trying to get service in to her home to test for mold.  She does not report that she has visible mold.  She is also looking at getting an air purifier for her home.  Advised this may help, certainly shouldn't hurt.  Cautioned against spending large amounts of money expecting resolution of allergy symptoms with air purifier.  She uses humidifier in the winter and notes improvement.      No sore throat or ear pain.  No concerns for decreased hearing, flux hearing, vertigo or aural fullness.  No concerns for thick purulent drainage from the nose.      Tamra has a history of Parkinson's.  She is currently on Sinemet.  She lives in her own home.      Nasal endoscopy completed on 1/12/21 showed minimally enlarged IT's with thin mucin.  No purulence or polyps.  Nasopharynx had small amount of dried drainage, ET's patent, no masses           Medications:     Current Outpatient Rx   Medication Sig Dispense Refill     azelastine (ASTELIN) 0.1 % nasal spray Spray 2 sprays into both nostrils 2 times daily 30 mL 3     calcium-vitamin D (CALTRATE) 600-400 MG-UNIT per tablet Take 1 tablet by  mouth 2 times daily       carbidopa-levodopa (SINEMET)  MG per tablet Take 1 tablet by mouth 4 times daily May take an additional 1/2 tablet up to twice daily between doses for breakthrough symtoms.       cetirizine (ZYRTEC) 10 MG tablet Take 1 tablet (10 mg) by mouth daily 60 tablet 3     COENZYME Q10 PO Take 1 capsule by mouth daily       diltiazem ER (TIAZAC) 180 MG 24 hr ER beaded capsule Take 180 mg by mouth daily       fluticasone (FLONASE) 50 MCG/ACT nasal spray Spray 2 sprays into both nostrils daily 16 g 3     fluticasone (FLONASE) 50 MCG/ACT spray Spray 2 sprays into both nostrils daily        fluticasone (FLOVENT HFA) 220 MCG/ACT Inhaler Inhale 2 puffs into the lungs 2 times daily       guaiFENesin (MUCINEX) 600 MG 12 hr tablet Take 2 tablets (1,200 mg) by mouth 2 times daily as needed for congestion 28 tablet 3     Levothyroxine Sodium (SYNTHROID PO) Take 175 mcg by mouth daily        METOPROLOL SUCCINATE ER PO Take 100 mg by mouth daily        montelukast (SINGULAIR) 10 MG tablet Take 1 tablet (10 mg) by mouth At Bedtime 30 tablet 11     Multiple Vitamin (MULTI VITAMIN DAILY PO) Take 1 tablet by mouth daily       SIMVASTATIN PO Take 10 mg by mouth At Bedtime       triamcinolone (KENALOG) 0.1 % cream Apply topically 2 times daily        diphenhydrAMINE-acetaminophen (TYLENOL PM)  MG tablet Take 1 tablet by mouth nightly as needed for sleep              Allergies:     Allergies: Food and No clinical screening - see comments          Past Medical History:     No past medical history on file.         Past Surgical History:     Past Surgical History:   Procedure Laterality Date     COLONOSCOPY N/A 4/18/2018    Procedure: COLONOSCOPY;  FLEXIBLE  SIGMOIDOSCOPY WITH POLYPECTOMY;  Surgeon: Rashad Nickerson DO;  Location: [facility]     EXCISE BONE CYST SUBMANDIBULAR Right 11/20/2019    Procedure: EXCISION RIGHT MANDIBULAR CYST WITH NERVE MONITORING AND POSSIBLE FROZEN SECTIONS;  Surgeon: Elizabeth  "MD Viktoria;  Location: [facility] OR       ENT family history reviewed         Social History:     Social History     Tobacco Use     Smoking status: Never Smoker     Smokeless tobacco: Never Used   Substance Use Topics     Alcohol use: None     Drug use: None            Review of Systems:     ROS: See HPI         Physical Exam:     /62 (BP Location: Right arm, Patient Position: Sitting, Cuff Size: Adult Regular)   Pulse 74   Temp 97.3  F (36.3  C) (Tympanic)   Ht 1.676 m (5' 6\")   Wt 72.6 kg (160 lb)   SpO2 95%   BMI 25.82 kg/m      General - The patient is well nourished and well developed, and appears to have good nutritional status.  Alert and oriented to person and place, answers questions and cooperates with examination appropriately.   Head and Face - Normocephalic and atraumatic, with no gross asymmetry noted.  The facial nerve is intact, with strong symmetric movements.  Voice and Breathing - The patient was breathing comfortably without the use of accessory muscles. There was no wheezing, stridor. The patients voice was clear and strong, and had appropriate pitch and quality.  Ears - External ear normal. Canals are patent. Right tympanic membrane is intact without effusion, retraction or mass. Left tympanic membrane is intact without effusion, retraction or mass.  Eyes - Extraocular movements intact, sclera were not icteric or injected.  Mouth - Examination of the oral cavity showed pink, healthy oral mucosa. Dentition in good condition. No lesions or ulcerations noted. The tongue was mobile and midline.   Throat - The walls of the oropharynx were smooth, pink, moist, symmetric, and had no lesions or ulcerations.  The tonsillar pillars and soft palate were symmetric. The uvula was midline on elevation.    Neck - Normal midline excursion of the laryngotracheal complex during swallowing.  Full range of motion on passive movement.  Palpation of the occipital, submental, submandibular, internal jugular " chain, and supraclavicular nodes did not demonstrate any abnormal lymph nodes or masses.  Palpation of the thyroid was soft and smooth, with no nodules or goiter appreciated.  The trachea was mobile and midline.  Nose - External contour is symmetric, no gross deflection or scars.  Nasal mucosa is pink and moist with no abnormal mucus.  The septum and turbinates were evaluated with nasal speculum, no polyps, masses, or purulence noted on examination.         Assessment and Plan:       ICD-10-CM    1. Nasal congestion  R09.81 azelastine (ASTELIN) 0.1 % nasal spray   2. Post-nasal drip  R09.82 azelastine (ASTELIN) 0.1 % nasal spray   3. Perennial allergic rhinitis  J30.89 azelastine (ASTELIN) 0.1 % nasal spray       Use ocean nasal spray 3-4 times per day as needed for nasal congestion  Stop flonase  Start astelin nasal spray  Follow up in 6 months for recheck    Robyn GARRIDO  [facility]        Again, thank you for allowing me to participate in the care of your patient.        Sincerely,        Robyn Steiner NP

## 2021-04-12 ENCOUNTER — APPOINTMENT (OUTPATIENT)
Dept: GENERAL RADIOLOGY | Facility: HOSPITAL | Age: 71
End: 2021-04-12
Attending: NURSE PRACTITIONER
Payer: MEDICARE

## 2021-04-12 ENCOUNTER — HOSPITAL ENCOUNTER (EMERGENCY)
Facility: HOSPITAL | Age: 71
Discharge: HOME | End: 2021-04-12
Attending: NURSE PRACTITIONER | Admitting: NURSE PRACTITIONER
Payer: MEDICARE

## 2021-04-12 VITALS
TEMPERATURE: 99.7 F | DIASTOLIC BLOOD PRESSURE: 65 MMHG | SYSTOLIC BLOOD PRESSURE: 148 MMHG | OXYGEN SATURATION: 96 % | RESPIRATION RATE: 20 BRPM | HEART RATE: 93 BPM

## 2021-04-12 DIAGNOSIS — R06.2 WHEEZES: ICD-10-CM

## 2021-04-12 DIAGNOSIS — I10 ELEVATED BLOOD PRESSURE READING WITH DIAGNOSIS OF HYPERTENSION: ICD-10-CM

## 2021-04-12 LAB
ANION GAP SERPL CALCULATED.3IONS-SCNC: 6 MMOL/L (ref 3–14)
BASOPHILS # BLD AUTO: 0 10E9/L (ref 0–0.2)
BASOPHILS NFR BLD AUTO: 0.2 %
BUN SERPL-MCNC: 30 MG/DL (ref 7–30)
CALCIUM SERPL-MCNC: 8.5 MG/DL (ref 8.5–10.1)
CHLORIDE SERPL-SCNC: 107 MMOL/L (ref 94–109)
CO2 SERPL-SCNC: 26 MMOL/L (ref 20–32)
CREAT SERPL-MCNC: 0.82 MG/DL (ref 0.52–1.04)
D DIMER PPP FEU-MCNC: 0.3 UG/ML FEU (ref 0–0.5)
DIFFERENTIAL METHOD BLD: NORMAL
EOSINOPHIL # BLD AUTO: 0.1 10E9/L (ref 0–0.7)
EOSINOPHIL NFR BLD AUTO: 1.1 %
ERYTHROCYTE [DISTWIDTH] IN BLOOD BY AUTOMATED COUNT: 12.2 % (ref 10–15)
GFR SERPL CREATININE-BSD FRML MDRD: 73 ML/MIN/{1.73_M2}
GLUCOSE SERPL-MCNC: 124 MG/DL (ref 70–99)
HCT VFR BLD AUTO: 43.7 % (ref 35–47)
HGB BLD-MCNC: 14.6 G/DL (ref 11.7–15.7)
IMM GRANULOCYTES # BLD: 0 10E9/L (ref 0–0.4)
IMM GRANULOCYTES NFR BLD: 0.3 %
LYMPHOCYTES # BLD AUTO: 1.6 10E9/L (ref 0.8–5.3)
LYMPHOCYTES NFR BLD AUTO: 17.4 %
MCH RBC QN AUTO: 30.5 PG (ref 26.5–33)
MCHC RBC AUTO-ENTMCNC: 33.4 G/DL (ref 31.5–36.5)
MCV RBC AUTO: 91 FL (ref 78–100)
MONOCYTES # BLD AUTO: 0.8 10E9/L (ref 0–1.3)
MONOCYTES NFR BLD AUTO: 9 %
NEUTROPHILS # BLD AUTO: 6.5 10E9/L (ref 1.6–8.3)
NEUTROPHILS NFR BLD AUTO: 72 %
NRBC # BLD AUTO: 0 10*3/UL
NRBC BLD AUTO-RTO: 0 /100
NT-PROBNP SERPL-MCNC: 318 PG/ML (ref 0–900)
PLATELET # BLD AUTO: 344 10E9/L (ref 150–450)
POTASSIUM SERPL-SCNC: 3.8 MMOL/L (ref 3.4–5.3)
RBC # BLD AUTO: 4.79 10E12/L (ref 3.8–5.2)
SODIUM SERPL-SCNC: 139 MMOL/L (ref 133–144)
TROPONIN I SERPL-MCNC: <0.015 UG/L (ref 0–0.04)
WBC # BLD AUTO: 9 10E9/L (ref 4–11)

## 2021-04-12 PROCEDURE — 99284 EMERGENCY DEPT VISIT MOD MDM: CPT | Mod: 25

## 2021-04-12 PROCEDURE — 84484 ASSAY OF TROPONIN QUANT: CPT | Performed by: NURSE PRACTITIONER

## 2021-04-12 PROCEDURE — 71046 X-RAY EXAM CHEST 2 VIEWS: CPT

## 2021-04-12 PROCEDURE — 36415 COLL VENOUS BLD VENIPUNCTURE: CPT | Performed by: NURSE PRACTITIONER

## 2021-04-12 PROCEDURE — 80048 BASIC METABOLIC PNL TOTAL CA: CPT | Performed by: NURSE PRACTITIONER

## 2021-04-12 PROCEDURE — 85025 COMPLETE CBC W/AUTO DIFF WBC: CPT | Performed by: NURSE PRACTITIONER

## 2021-04-12 PROCEDURE — 83880 ASSAY OF NATRIURETIC PEPTIDE: CPT | Performed by: NURSE PRACTITIONER

## 2021-04-12 PROCEDURE — 99284 EMERGENCY DEPT VISIT MOD MDM: CPT | Performed by: NURSE PRACTITIONER

## 2021-04-12 PROCEDURE — 250N000009 HC RX 250: Performed by: NURSE PRACTITIONER

## 2021-04-12 PROCEDURE — 85379 FIBRIN DEGRADATION QUANT: CPT | Performed by: NURSE PRACTITIONER

## 2021-04-12 PROCEDURE — 94640 AIRWAY INHALATION TREATMENT: CPT

## 2021-04-12 RX ORDER — IPRATROPIUM BROMIDE AND ALBUTEROL SULFATE 2.5; .5 MG/3ML; MG/3ML
3 SOLUTION RESPIRATORY (INHALATION) ONCE
Status: COMPLETED | OUTPATIENT
Start: 2021-04-12 | End: 2021-04-12

## 2021-04-12 RX ADMIN — IPRATROPIUM BROMIDE AND ALBUTEROL SULFATE 3 ML: .5; 2.5 SOLUTION RESPIRATORY (INHALATION) at 21:27

## 2021-04-12 ASSESSMENT — ENCOUNTER SYMPTOMS
VOMITING: 0
SHORTNESS OF BREATH: 1
ACTIVITY CHANGE: 1
COUGH: 0
NAUSEA: 0
DIARRHEA: 0
HEADACHES: 0
LIGHT-HEADEDNESS: 1
DIZZINESS: 0
CHILLS: 0
FEVER: 1
FATIGUE: 1

## 2021-04-12 NOTE — ED NOTES
"Patient presents to emergency room with c/o elevated blood pressure. Pt reports yesterday and today she had episodes of feeling \"hot\" and dizzy. Pt took her BP at home when she had these episodes and found her blood pressure to be elevated. Denies pain. Denies SOB. Tachypnea noted after ambulating to room 7. Hx Parkinsons disease. Awake and alert. Denies fever or chills. Hx allergies. Taking zyrtec and mucinex. Denies any recent falls or trauma. Denies dizziness at this time.   "

## 2021-04-12 NOTE — ED TRIAGE NOTES
Pt c/o feeling hot and her BP being up to 203/85 vat home. Pt denies feeling SOB but RR 32 and slightly labored in triage. Also c/o her feet and legs feeling more swollen.

## 2021-04-13 NOTE — ED NOTES
Pt requesting water. Updated provider. Okay to have ice water. Tachypnea remains. Pt sitting upright in bed.   Pt reports increased leg swelling that started a week ago. Compression stockings in place.

## 2021-04-13 NOTE — ED PROVIDER NOTES
"  History     Chief Complaint   Patient presents with     Hypertension     HPI  Tamra Dc is a 71 year old female who is accompanied per her neighbor. She presents with concerns of elevated blood pressure reading at home. She felt warm this morning and had found her blood pressure to be 170's when she took it at home. She called Dr. Hidalgo's office and her nurse called back this afternoon and requested her to retake her BP. It was 203/85. This is accompanied with low grade fever, little bit of fatigue, shortness of breath when she lays down, and feeling a, \"little funny in her head.\" No known sick contacts. She did take ibuprofen when she felt warm. Non-smoker. She took cardiac and BP medications this morning. History of hypertension and parkinson's. Denies chills, nausea, vomiting, and diarrhea.    Allergies:  Allergies   Allergen Reactions     Food      Milk - nasal congestion, diarrhea.     No Clinical Screening - See Comments      Significant reactions to trees, grasses, dust mite, ragweed, and Alternaria on skin testing 1/28/02.       Problem List:    Patient Active Problem List    Diagnosis Date Noted     Median mandibular cyst 11/11/2019     Priority: Medium     Added automatically from request for surgery [id number]       BMI 26.0-26.9,adult 10/11/2019     Priority: Medium     9/26/19 visit       Epidermal inclusion cyst 01/23/2018     Priority: Medium     Parkinson disease (H) 01/22/2018     Priority: Medium     Primary osteoarthritis of both knees 09/13/2016     Priority: Medium     Seborrheic keratosis 03/04/2013     Priority: Medium     Osteopenia 06/08/2009     Priority: Medium     Sustained supraventricular tachycardia (H) 02/18/2009     Priority: Medium     Essential hypertension 01/28/2009     Priority: Medium     Overview:   IMO Update       Blood glucose abnormal 06/18/2007     Priority: Medium     Overview:   IMO Update 10/11       Allergic state 01/10/2007     Priority: Medium     Overview: "   IMO Update 10/11       Malignant neoplasm of upper-outer quadrant of female breast (H) 06/08/2006     Priority: Medium     History of breast cancer 06/08/2006     Priority: Medium     Hyperlipidemia 06/05/2006     Priority: Medium     Overview:   IMO Update 10/11       Hypothyroidism 06/05/2006     Priority: Medium     Overview:   IMO Update 10/11          Past Medical History:    History reviewed. No pertinent past medical history.    Past Surgical History:    Past Surgical History:   Procedure Laterality Date     COLONOSCOPY N/A 4/18/2018    Procedure: COLONOSCOPY;  FLEXIBLE  SIGMOIDOSCOPY WITH POLYPECTOMY;  Surgeon: Rashad Nickerson DO;  Location: [facility]     EXCISE BONE CYST SUBMANDIBULAR Right 11/20/2019    Procedure: EXCISION RIGHT MANDIBULAR CYST WITH NERVE MONITORING AND POSSIBLE FROZEN SECTIONS;  Surgeon: Viktoria Johnson MD;  Location: [facility]       Family History:    History reviewed. No pertinent family history.    Social History:  Marital Status:   [5]  Social History     Tobacco Use     Smoking status: Never Smoker     Smokeless tobacco: Never Used   Substance Use Topics     Alcohol use: None     Drug use: None        Medications:    azelastine (ASTELIN) 0.1 % nasal spray  calcium-vitamin D (CALTRATE) 600-400 MG-UNIT per tablet  carbidopa-levodopa (SINEMET)  MG per tablet  cetirizine (ZYRTEC) 10 MG tablet  COENZYME Q10 PO  diltiazem ER (TIAZAC) 180 MG 24 hr ER beaded capsule  diphenhydrAMINE-acetaminophen (TYLENOL PM)  MG tablet  fluticasone (FLONASE) 50 MCG/ACT nasal spray  fluticasone (FLONASE) 50 MCG/ACT spray  fluticasone (FLOVENT HFA) 220 MCG/ACT Inhaler  guaiFENesin (MUCINEX) 600 MG 12 hr tablet  Levothyroxine Sodium (SYNTHROID PO)  METOPROLOL SUCCINATE ER PO  montelukast (SINGULAIR) 10 MG tablet  Multiple Vitamin (MULTI VITAMIN DAILY PO)  SIMVASTATIN PO  triamcinolone (KENALOG) 0.1 % cream          Review of Systems   Constitutional: Positive for activity change,  fatigue (little bit) and fever. Negative for chills.   Eyes: Positive for visual disturbance (dry eyes).   Respiratory: Positive for shortness of breath (when she lays down). Negative for cough.    Gastrointestinal: Negative for diarrhea, nausea and vomiting.   Neurological: Positive for light-headedness (feel funny in the head). Negative for dizziness and headaches.       Physical Exam   BP: 162/75  Pulse: 97  Temp: 99.6  F (37.6  C)  Resp: (!) 32(may be movement from Parkinson. Will continue to evaluate.)  SpO2: 95 %      Physical Exam  Vitals signs and nursing note reviewed.   Constitutional:       General: She is not in acute distress.  HENT:      Head: Normocephalic.      Right Ear: Tympanic membrane and ear canal normal.      Left Ear: Tympanic membrane and ear canal normal.      Nose: Nose normal.      Right Sinus: No maxillary sinus tenderness or frontal sinus tenderness.      Left Sinus: No maxillary sinus tenderness or frontal sinus tenderness.      Mouth/Throat:      Lips: Pink.      Mouth: Mucous membranes are moist.   Eyes:      Extraocular Movements: Extraocular movements intact.      Conjunctiva/sclera: Conjunctivae normal.      Pupils: Pupils are equal, round, and reactive to light.   Cardiovascular:      Rate and Rhythm: Normal rate and regular rhythm.      Heart sounds: Normal heart sounds. No murmur.   Pulmonary:      Effort: Pulmonary effort is normal. No respiratory distress.      Breath sounds: Examination of the right-upper field reveals rhonchi. Examination of the left-upper field reveals wheezing. Examination of the right-lower field reveals wheezing. Examination of the left-lower field reveals wheezing. Wheezing and rhonchi present.   Skin:     General: Skin is warm and dry.   Neurological:      Mental Status: She is alert and oriented to person, place, and time.      GCS: GCS eye subscore is 4. GCS verbal subscore is 5. GCS motor subscore is 6.      Cranial Nerves: Cranial nerves are  intact.      Motor: Motor function is intact. No weakness.      Coordination: Coordination is intact.      Comments: No bilateral arm weakness. Verbalization is slightly slower response than normal; related it to parkinson's. Responses were clear when they occurred.   Psychiatric:         Behavior: Behavior normal.         ED Course        Procedures           Results for orders placed or performed during the hospital encounter of 04/12/21 (from the past 24 hour(s))   CBC with platelets differential   Result Value Ref Range    WBC 9.0 4.0 - 11.0 10e9/L    RBC Count 4.79 3.8 - 5.2 10e12/L    Hemoglobin 14.6 11.7 - 15.7 g/dL    Hematocrit 43.7 35.0 - 47.0 %    MCV 91 78 - 100 fl    MCH 30.5 26.5 - 33.0 pg    MCHC 33.4 31.5 - 36.5 g/dL    RDW 12.2 10.0 - 15.0 %    Platelet Count 344 150 - 450 10e9/L    Diff Method Automated Method     % Neutrophils 72.0 %    % Lymphocytes 17.4 %    % Monocytes 9.0 %    % Eosinophils 1.1 %    % Basophils 0.2 %    % Immature Granulocytes 0.3 %    Nucleated RBCs 0 0 /100    Absolute Neutrophil 6.5 1.6 - 8.3 10e9/L    Absolute Lymphocytes 1.6 0.8 - 5.3 10e9/L    Absolute Monocytes 0.8 0.0 - 1.3 10e9/L    Absolute Eosinophils 0.1 0.0 - 0.7 10e9/L    Absolute Basophils 0.0 0.0 - 0.2 10e9/L    Abs Immature Granulocytes 0.0 0 - 0.4 10e9/L    Absolute Nucleated RBC 0.0    D dimer quantitative   Result Value Ref Range    D Dimer 0.3 0.0 - 0.50 ug/ml FEU   Troponin I   Result Value Ref Range    Troponin I ES <0.015 0.000 - 0.045 ug/L   Basic metabolic panel   Result Value Ref Range    Sodium 139 133 - 144 mmol/L    Potassium 3.8 3.4 - 5.3 mmol/L    Chloride 107 94 - 109 mmol/L    Carbon Dioxide 26 20 - 32 mmol/L    Anion Gap 6 3 - 14 mmol/L    Glucose 124 (H) 70 - 99 mg/dL    Urea Nitrogen 30 7 - 30 mg/dL    Creatinine 0.82 0.52 - 1.04 mg/dL    GFR Estimate 73 >60 mL/min/[1.73_m2]    GFR Estimate If Black 85 >60 mL/min/[1.73_m2]    Calcium 8.5 8.5 - 10.1 mg/dL   Nt probnp inpatient   Result Value  "Ref Range    N-Terminal Pro BNP Inpatient 318 0 - 900 pg/mL   Chest XR,  PA & LAT    Narrative    Procedure:XR CHEST 2 VW    Clinical history:Female, 71 years, r/o pneumonia, rhonchi wheezes RUL    Technique: Two views are submitted.    Comparison: 3/20/2016    Findings: The cardiac silhouette is normal. The pulmonary vasculature  is normal.    The lungs the hyperinflated and demonstrate no evidence of focal  consolidation.. Bony structures healed fracture seen along the dorsal  lateral margin of the right eighth rib.      Impression    Impression:   No evidence of acute pneumonia.     Persistent hyperinflation the lungs with unchanged  atelectasis/scarring in the periphery of the left upper lobe/lingula.    Healed fracture now seen in the dorsal lateral aspect of the right  eighth rib.    MAIA DAMON MD       Medications   ipratropium - albuterol 0.5 mg/2.5 mg/3 mL (DUONEB) neb solution 3 mL (3 mLs Nebulization Given 4/12/21 2127)       Assessments & Plan (with Medical Decision Making)     I have reviewed the nursing notes.    I have reviewed the findings, diagnosis, plan and need for follow up with the patient.  (R06.2) Wheezes  (I10) Elevated blood pressure reading with diagnosis of hypertension  Comment: 71 year old female who is accompanied per her neighbor. She presents with concerns of elevated blood pressure reading at home. She felt warm this morning and had found her blood pressure to be 170's when she took it at home. She called Dr. Hidalgo's office and her nurse called back this afternoon and requested her to retake her BP. It was 203/85. This is accompanied with low grade fever, little bit of fatigue, shortness of breath when she lays down, and feeling a, \"little funny in her head.\" No known sick contacts. She did take ibuprofen when she felt warm. Non-smoker. She took cardiac and BP medications this morning. History of hypertension and parkinson's. Denies chills, nausea, vomiting, and " diarrhea.    MDM:NHT. Lungs have scattered wheezes with rhonchi RUL. O2 sats 96% on room air. Negative neuro assessment.  /65 and RR 20 upon discharge.    BNP negative  CMP negative  Troponin negative  D-dimer and CBC negative    CXR reviewed and per radiology:  Impression:   No evidence of acute pneumonia.   Persistent hyperinflation the lungs with unchanged  atelectasis/scarring in the periphery of the left upper lobe/lingula.  Healed fracture now seen in the dorsal lateral aspect of the right  eighth rib.    States feels better after duo neb.    Plan: albuterol inhaler every six hours as needed for shortness of breath. Education provided and/or discussed for this/these medication and for hypertension.  Return to ER or call 911 if you develop chest pain, shortness of breath, blurry vision or stroke like symptoms. Weakness in your extremities, facial droop, or confusion.   To follow-up with PCP in two days.   These discharge instructions and medications were reviewed with her and understanding verbalized.    Discharge Medication List as of 4/12/2021 10:24 PM          Final diagnoses:   Elevated blood pressure reading with diagnosis of hypertension   Wheezes       4/12/2021   [facility]     Rajni Young, CNP  04/13/21 1800

## 2021-04-13 NOTE — PROGRESS NOTES
Instruct of MDI with spacer.  Did not give medication to patient just provided MDI and spacer to her.

## 2021-04-13 NOTE — DISCHARGE INSTRUCTIONS
Return to ER or call 911 if you develop chest pain, shortness of breath, blurry vision or stroke like symptoms. Weakness in your extremities, facial droop, or confusion.

## 2022-02-17 PROBLEM — Z71.89 OTHER SPECIFIED COUNSELING: Status: RESOLVED | Noted: 2019-08-01 | Resolved: 2019-11-22

## (undated) DEVICE — GOWN-SURG LG LVL 3 REINFORCED

## (undated) DEVICE — SUTURE-VICRYL 5-0 P-3 J493H

## (undated) DEVICE — IRRIGATION-H2O 1000ML

## (undated) DEVICE — GOWN-SURG XL LVL 3 REINFORCED

## (undated) DEVICE — FORCEP-COLON BIOPSY LARGE W/NEEDLE 240CM

## (undated) DEVICE — PACK-SET UP-CUSTOM

## (undated) DEVICE — SCD SLEEVE-KNEE REG.

## (undated) DEVICE — CANISTER-SUCTION 2000CC

## (undated) DEVICE — NDL COUNTER-20-40 CT MAGNET/FOAM BLOCK

## (undated) DEVICE — DRAPE-U DRAPE SPLIT SHEET 72" X 122"

## (undated) DEVICE — SUTURE-SILK 0 SH K834H

## (undated) DEVICE — TUBING-SUCTION 20FT

## (undated) DEVICE — DRSG-NON ADHERING 3 X 4 TELFA

## (undated) DEVICE — MARKER-SKIN REG

## (undated) DEVICE — ELECTRODE-SUBDERMAL PAIRED

## (undated) DEVICE — GLV-6.5 PROTEXIS PI CLASSIC LF/PF

## (undated) DEVICE — LIGHT HANDLE COVER

## (undated) DEVICE — CAUTERY-MICROFINE NEEDLE

## (undated) DEVICE — CAUTERY PENCIL-SMOKE EVACUATION

## (undated) DEVICE — SYRINGE-10CC FINGER

## (undated) DEVICE — SYRINGE-ASEPTO IRRIGATION

## (undated) DEVICE — SUTURE-PLAIN 5-0 FAST ABSORBING PC-1 1915G

## (undated) DEVICE — CAUTERY PAD-POLYHESIVE II ADULT

## (undated) DEVICE — PROBE-PRASS STIMULATOR ENT

## (undated) DEVICE — LABEL-STERILE PREPRINTED FOR OR

## (undated) RX ORDER — PROPOFOL 10 MG/ML
INJECTION, EMULSION INTRAVENOUS
Status: DISPENSED
Start: 2019-11-20

## (undated) RX ORDER — PROPOFOL 10 MG/ML
INJECTION, EMULSION INTRAVENOUS
Status: DISPENSED
Start: 2018-04-18

## (undated) RX ORDER — FENTANYL CITRATE 50 UG/ML
INJECTION, SOLUTION INTRAMUSCULAR; INTRAVENOUS
Status: DISPENSED
Start: 2018-04-18

## (undated) RX ORDER — ONDANSETRON 2 MG/ML
INJECTION INTRAMUSCULAR; INTRAVENOUS
Status: DISPENSED
Start: 2019-11-20

## (undated) RX ORDER — LIDOCAINE HYDROCHLORIDE 20 MG/ML
INJECTION, SOLUTION EPIDURAL; INFILTRATION; INTRACAUDAL; PERINEURAL
Status: DISPENSED
Start: 2018-04-18

## (undated) RX ORDER — FENTANYL CITRATE 50 UG/ML
INJECTION, SOLUTION INTRAMUSCULAR; INTRAVENOUS
Status: DISPENSED
Start: 2019-11-20

## (undated) RX ORDER — LIDOCAINE HYDROCHLORIDE 20 MG/ML
INJECTION, SOLUTION EPIDURAL; INFILTRATION; INTRACAUDAL; PERINEURAL
Status: DISPENSED
Start: 2019-11-20

## (undated) RX ORDER — DEXAMETHASONE SODIUM PHOSPHATE 10 MG/ML
INJECTION, SOLUTION INTRAMUSCULAR; INTRAVENOUS
Status: DISPENSED
Start: 2019-11-20